# Patient Record
Sex: FEMALE | Race: OTHER | Employment: UNEMPLOYED | URBAN - NONMETROPOLITAN AREA
[De-identification: names, ages, dates, MRNs, and addresses within clinical notes are randomized per-mention and may not be internally consistent; named-entity substitution may affect disease eponyms.]

---

## 2024-08-13 ENCOUNTER — APPOINTMENT (EMERGENCY)
Dept: RADIOLOGY | Facility: HOSPITAL | Age: 74
DRG: 389 | End: 2024-08-13
Payer: COMMERCIAL

## 2024-08-13 ENCOUNTER — HOSPITAL ENCOUNTER (INPATIENT)
Facility: HOSPITAL | Age: 74
LOS: 2 days | Discharge: HOME | DRG: 389 | End: 2024-08-16
Attending: EMERGENCY MEDICINE | Admitting: FAMILY MEDICINE
Payer: COMMERCIAL

## 2024-08-13 ENCOUNTER — APPOINTMENT (EMERGENCY)
Dept: CT IMAGING | Facility: HOSPITAL | Age: 74
DRG: 389 | End: 2024-08-13
Payer: COMMERCIAL

## 2024-08-13 DIAGNOSIS — R10.9 ABDOMINAL PAIN: Primary | ICD-10-CM

## 2024-08-13 DIAGNOSIS — K56.609 SBO (SMALL BOWEL OBSTRUCTION) (HCC): ICD-10-CM

## 2024-08-13 DIAGNOSIS — R91.1 PULMONARY NODULE: ICD-10-CM

## 2024-08-13 DIAGNOSIS — R11.2 NAUSEA AND VOMITING: ICD-10-CM

## 2024-08-13 LAB
ALBUMIN SERPL BCG-MCNC: 5.2 G/DL (ref 3.5–5)
ALP SERPL-CCNC: 59 U/L (ref 34–104)
ALT SERPL W P-5'-P-CCNC: 13 U/L (ref 7–52)
ANION GAP SERPL CALCULATED.3IONS-SCNC: 13 MMOL/L (ref 4–13)
AST SERPL W P-5'-P-CCNC: 17 U/L (ref 13–39)
BASOPHILS # BLD AUTO: 0.02 THOUSANDS/ÂΜL (ref 0–0.1)
BASOPHILS NFR BLD AUTO: 0 % (ref 0–1)
BILIRUB SERPL-MCNC: 0.53 MG/DL (ref 0.2–1)
BNP SERPL-MCNC: 30 PG/ML (ref 0–100)
BUN SERPL-MCNC: 16 MG/DL (ref 5–25)
CALCIUM SERPL-MCNC: 9.8 MG/DL (ref 8.4–10.2)
CARDIAC TROPONIN I PNL SERPL HS: <2 NG/L
CARDIAC TROPONIN I PNL SERPL HS: <2 NG/L
CHLORIDE SERPL-SCNC: 94 MMOL/L (ref 96–108)
CO2 SERPL-SCNC: 28 MMOL/L (ref 21–32)
CREAT SERPL-MCNC: 0.71 MG/DL (ref 0.6–1.3)
EOSINOPHIL # BLD AUTO: 0.03 THOUSAND/ÂΜL (ref 0–0.61)
EOSINOPHIL NFR BLD AUTO: 0 % (ref 0–6)
ERYTHROCYTE [DISTWIDTH] IN BLOOD BY AUTOMATED COUNT: 12.2 % (ref 11.6–15.1)
GFR SERPL CREATININE-BSD FRML MDRD: 84 ML/MIN/1.73SQ M
GLUCOSE SERPL-MCNC: 191 MG/DL (ref 65–140)
HCT VFR BLD AUTO: 46.4 % (ref 34.8–46.1)
HGB BLD-MCNC: 16.1 G/DL (ref 11.5–15.4)
IMM GRANULOCYTES # BLD AUTO: 0.03 THOUSAND/UL (ref 0–0.2)
IMM GRANULOCYTES NFR BLD AUTO: 0 % (ref 0–2)
LIPASE SERPL-CCNC: 48 U/L (ref 11–82)
LYMPHOCYTES # BLD AUTO: 1.42 THOUSANDS/ÂΜL (ref 0.6–4.47)
LYMPHOCYTES NFR BLD AUTO: 17 % (ref 14–44)
MAGNESIUM SERPL-MCNC: 2.3 MG/DL (ref 1.9–2.7)
MCH RBC QN AUTO: 30 PG (ref 26.8–34.3)
MCHC RBC AUTO-ENTMCNC: 34.7 G/DL (ref 31.4–37.4)
MCV RBC AUTO: 86 FL (ref 82–98)
MONOCYTES # BLD AUTO: 0.3 THOUSAND/ÂΜL (ref 0.17–1.22)
MONOCYTES NFR BLD AUTO: 4 % (ref 4–12)
NEUTROPHILS # BLD AUTO: 6.78 THOUSANDS/ÂΜL (ref 1.85–7.62)
NEUTS SEG NFR BLD AUTO: 79 % (ref 43–75)
NRBC BLD AUTO-RTO: 0 /100 WBCS
PLATELET # BLD AUTO: 256 THOUSANDS/UL (ref 149–390)
PMV BLD AUTO: 9.6 FL (ref 8.9–12.7)
POTASSIUM SERPL-SCNC: 4 MMOL/L (ref 3.5–5.3)
PROT SERPL-MCNC: 8 G/DL (ref 6.4–8.4)
RBC # BLD AUTO: 5.37 MILLION/UL (ref 3.81–5.12)
SODIUM SERPL-SCNC: 135 MMOL/L (ref 135–147)
WBC # BLD AUTO: 8.58 THOUSAND/UL (ref 4.31–10.16)

## 2024-08-13 PROCEDURE — 74176 CT ABD & PELVIS W/O CONTRAST: CPT

## 2024-08-13 PROCEDURE — 99285 EMERGENCY DEPT VISIT HI MDM: CPT

## 2024-08-13 PROCEDURE — 0D9670Z DRAINAGE OF STOMACH WITH DRAINAGE DEVICE, VIA NATURAL OR ARTIFICIAL OPENING: ICD-10-PCS | Performed by: HOSPITALIST

## 2024-08-13 PROCEDURE — 96375 TX/PRO/DX INJ NEW DRUG ADDON: CPT

## 2024-08-13 PROCEDURE — 84484 ASSAY OF TROPONIN QUANT: CPT | Performed by: EMERGENCY MEDICINE

## 2024-08-13 PROCEDURE — 93005 ELECTROCARDIOGRAM TRACING: CPT

## 2024-08-13 PROCEDURE — 71045 X-RAY EXAM CHEST 1 VIEW: CPT

## 2024-08-13 PROCEDURE — 36415 COLL VENOUS BLD VENIPUNCTURE: CPT | Performed by: EMERGENCY MEDICINE

## 2024-08-13 PROCEDURE — 83735 ASSAY OF MAGNESIUM: CPT | Performed by: EMERGENCY MEDICINE

## 2024-08-13 PROCEDURE — 85025 COMPLETE CBC W/AUTO DIFF WBC: CPT | Performed by: EMERGENCY MEDICINE

## 2024-08-13 PROCEDURE — 83690 ASSAY OF LIPASE: CPT | Performed by: EMERGENCY MEDICINE

## 2024-08-13 PROCEDURE — 99285 EMERGENCY DEPT VISIT HI MDM: CPT | Performed by: EMERGENCY MEDICINE

## 2024-08-13 PROCEDURE — 96365 THER/PROPH/DIAG IV INF INIT: CPT

## 2024-08-13 PROCEDURE — 80053 COMPREHEN METABOLIC PANEL: CPT | Performed by: EMERGENCY MEDICINE

## 2024-08-13 PROCEDURE — 83880 ASSAY OF NATRIURETIC PEPTIDE: CPT | Performed by: EMERGENCY MEDICINE

## 2024-08-13 RX ORDER — ONDANSETRON 2 MG/ML
4 INJECTION INTRAMUSCULAR; INTRAVENOUS ONCE
Status: COMPLETED | OUTPATIENT
Start: 2024-08-13 | End: 2024-08-13

## 2024-08-13 RX ORDER — BALSALAZIDE DISODIUM 750 MG/1
2250 CAPSULE ORAL 3 TIMES DAILY
COMMUNITY
Start: 2024-07-29

## 2024-08-13 RX ORDER — EMPAGLIFLOZIN, LINAGLIPTIN, METFORMIN HYDROCHLORIDE 10; 5; 1000 MG/1; MG/1; MG/1
1 TABLET, EXTENDED RELEASE ORAL DAILY
COMMUNITY
Start: 2024-08-11

## 2024-08-13 RX ORDER — ACETAMINOPHEN 10 MG/ML
1000 INJECTION, SOLUTION INTRAVENOUS ONCE
Status: COMPLETED | OUTPATIENT
Start: 2024-08-13 | End: 2024-08-14

## 2024-08-13 RX ORDER — SODIUM CHLORIDE, SODIUM GLUCONATE, SODIUM ACETATE, POTASSIUM CHLORIDE, MAGNESIUM CHLORIDE, SODIUM PHOSPHATE, DIBASIC, AND POTASSIUM PHOSPHATE .53; .5; .37; .037; .03; .012; .00082 G/100ML; G/100ML; G/100ML; G/100ML; G/100ML; G/100ML; G/100ML
500 INJECTION, SOLUTION INTRAVENOUS ONCE
Status: COMPLETED | OUTPATIENT
Start: 2024-08-13 | End: 2024-08-13

## 2024-08-13 RX ORDER — FAMOTIDINE 10 MG/ML
40 INJECTION, SOLUTION INTRAVENOUS ONCE
Status: COMPLETED | OUTPATIENT
Start: 2024-08-13 | End: 2024-08-14

## 2024-08-13 RX ORDER — GLIPIZIDE 10 MG/1
10 TABLET, FILM COATED, EXTENDED RELEASE ORAL DAILY
COMMUNITY
Start: 2024-06-03

## 2024-08-13 RX ORDER — MOMETASONE FUROATE MONOHYDRATE 50 UG/1
SPRAY, METERED NASAL
COMMUNITY
Start: 2024-06-03

## 2024-08-13 RX ORDER — METOPROLOL SUCCINATE 50 MG/1
50 TABLET, EXTENDED RELEASE ORAL DAILY
COMMUNITY
Start: 2024-07-14

## 2024-08-13 RX ORDER — LOSARTAN POTASSIUM AND HYDROCHLOROTHIAZIDE 12.5; 1 MG/1; MG/1
1 TABLET ORAL DAILY
COMMUNITY
Start: 2024-08-02

## 2024-08-13 RX ORDER — OMEPRAZOLE 40 MG/1
40 CAPSULE, DELAYED RELEASE ORAL DAILY
COMMUNITY
Start: 2024-08-08

## 2024-08-13 RX ORDER — SEMAGLUTIDE 0.68 MG/ML
0.25 INJECTION, SOLUTION SUBCUTANEOUS
COMMUNITY
Start: 2024-06-21

## 2024-08-13 RX ORDER — ROSUVASTATIN CALCIUM 40 MG/1
40 TABLET, COATED ORAL DAILY
COMMUNITY
Start: 2024-07-06

## 2024-08-13 RX ORDER — ALENDRONATE SODIUM 70 MG/1
70 TABLET ORAL WEEKLY
COMMUNITY
Start: 2024-06-19

## 2024-08-13 RX ORDER — SODIUM CHLORIDE 9 MG/ML
3 INJECTION INTRAVENOUS
Status: DISCONTINUED | OUTPATIENT
Start: 2024-08-13 | End: 2024-08-16 | Stop reason: HOSPADM

## 2024-08-13 RX ADMIN — SODIUM CHLORIDE, SODIUM GLUCONATE, SODIUM ACETATE, POTASSIUM CHLORIDE, MAGNESIUM CHLORIDE, SODIUM PHOSPHATE, DIBASIC, AND POTASSIUM PHOSPHATE 500 ML: .53; .5; .37; .037; .03; .012; .00082 INJECTION, SOLUTION INTRAVENOUS at 22:59

## 2024-08-13 RX ADMIN — ONDANSETRON 4 MG: 2 INJECTION INTRAMUSCULAR; INTRAVENOUS at 22:58

## 2024-08-14 ENCOUNTER — APPOINTMENT (INPATIENT)
Dept: RADIOLOGY | Facility: HOSPITAL | Age: 74
DRG: 389 | End: 2024-08-14
Payer: COMMERCIAL

## 2024-08-14 PROBLEM — I10 ESSENTIAL HYPERTENSION: Status: ACTIVE | Noted: 2022-08-31

## 2024-08-14 PROBLEM — R10.13 EPIGASTRIC PAIN: Status: ACTIVE | Noted: 2024-08-14

## 2024-08-14 PROBLEM — N85.01 SIMPLE ENDOMETRIAL HYPERPLASIA: Status: ACTIVE | Noted: 2022-05-05

## 2024-08-14 PROBLEM — K56.609 SBO (SMALL BOWEL OBSTRUCTION) (HCC): Status: ACTIVE | Noted: 2024-08-14

## 2024-08-14 PROBLEM — K21.9 GASTROESOPHAGEAL REFLUX DISEASE WITHOUT ESOPHAGITIS: Status: ACTIVE | Noted: 2024-08-14

## 2024-08-14 PROBLEM — E11.9 TYPE 2 DIABETES MELLITUS WITHOUT COMPLICATION, WITHOUT LONG-TERM CURRENT USE OF INSULIN (HCC): Status: ACTIVE | Noted: 2024-08-14

## 2024-08-14 PROBLEM — Z86.39 HISTORY OF DIABETES MELLITUS: Status: ACTIVE | Noted: 2022-08-31

## 2024-08-14 PROBLEM — E78.5 HYPERLIPIDEMIA: Status: ACTIVE | Noted: 2022-08-31

## 2024-08-14 PROBLEM — R11.2 NAUSEA AND VOMITING: Status: ACTIVE | Noted: 2024-08-14

## 2024-08-14 LAB
4HR DELTA HS TROPONIN: >1 NG/L
ANION GAP SERPL CALCULATED.3IONS-SCNC: 9 MMOL/L (ref 4–13)
ATRIAL RATE: 85 BPM
ATRIAL RATE: 87 BPM
ATRIAL RATE: 99 BPM
BACTERIA UR QL AUTO: ABNORMAL /HPF
BILIRUB UR QL STRIP: NEGATIVE
BUN SERPL-MCNC: 14 MG/DL (ref 5–25)
CALCIUM SERPL-MCNC: 8.8 MG/DL (ref 8.4–10.2)
CARDIAC TROPONIN I PNL SERPL HS: 3 NG/L
CHLORIDE SERPL-SCNC: 99 MMOL/L (ref 96–108)
CLARITY UR: CLEAR
CO2 SERPL-SCNC: 27 MMOL/L (ref 21–32)
COLOR UR: ABNORMAL
CREAT SERPL-MCNC: 0.73 MG/DL (ref 0.6–1.3)
ERYTHROCYTE [DISTWIDTH] IN BLOOD BY AUTOMATED COUNT: 12 % (ref 11.6–15.1)
GFR SERPL CREATININE-BSD FRML MDRD: 81 ML/MIN/1.73SQ M
GLUCOSE SERPL-MCNC: 110 MG/DL (ref 65–140)
GLUCOSE SERPL-MCNC: 112 MG/DL (ref 65–140)
GLUCOSE SERPL-MCNC: 161 MG/DL (ref 65–140)
GLUCOSE SERPL-MCNC: 93 MG/DL (ref 65–140)
GLUCOSE UR STRIP-MCNC: ABNORMAL MG/DL
HCT VFR BLD AUTO: 42.9 % (ref 34.8–46.1)
HGB BLD-MCNC: 14.7 G/DL (ref 11.5–15.4)
HGB UR QL STRIP.AUTO: ABNORMAL
KETONES UR STRIP-MCNC: ABNORMAL MG/DL
LEUKOCYTE ESTERASE UR QL STRIP: NEGATIVE
MAGNESIUM SERPL-MCNC: 2.2 MG/DL (ref 1.9–2.7)
MCH RBC QN AUTO: 29.7 PG (ref 26.8–34.3)
MCHC RBC AUTO-ENTMCNC: 34.3 G/DL (ref 31.4–37.4)
MCV RBC AUTO: 87 FL (ref 82–98)
NITRITE UR QL STRIP: NEGATIVE
NON-SQ EPI CELLS URNS QL MICRO: ABNORMAL /HPF
P AXIS: 62 DEGREES
P AXIS: 63 DEGREES
P AXIS: 66 DEGREES
PH UR STRIP.AUTO: 7 [PH]
PLATELET # BLD AUTO: 252 THOUSANDS/UL (ref 149–390)
PMV BLD AUTO: 9.4 FL (ref 8.9–12.7)
POTASSIUM SERPL-SCNC: 3.8 MMOL/L (ref 3.5–5.3)
PR INTERVAL: 152 MS
PR INTERVAL: 154 MS
PR INTERVAL: 158 MS
PROT UR STRIP-MCNC: ABNORMAL MG/DL
QRS AXIS: 76 DEGREES
QRS AXIS: 80 DEGREES
QRS AXIS: 84 DEGREES
QRSD INTERVAL: 68 MS
QRSD INTERVAL: 72 MS
QRSD INTERVAL: 78 MS
QT INTERVAL: 364 MS
QT INTERVAL: 386 MS
QT INTERVAL: 394 MS
QTC INTERVAL: 464 MS
QTC INTERVAL: 467 MS
QTC INTERVAL: 468 MS
RBC # BLD AUTO: 4.95 MILLION/UL (ref 3.81–5.12)
RBC #/AREA URNS AUTO: ABNORMAL /HPF
SODIUM SERPL-SCNC: 135 MMOL/L (ref 135–147)
SP GR UR STRIP.AUTO: 1.01 (ref 1–1.03)
T WAVE AXIS: 43 DEGREES
T WAVE AXIS: 53 DEGREES
T WAVE AXIS: 56 DEGREES
UROBILINOGEN UR STRIP-ACNC: <2 MG/DL
VENTRICULAR RATE: 85 BPM
VENTRICULAR RATE: 87 BPM
VENTRICULAR RATE: 99 BPM
WBC # BLD AUTO: 8.91 THOUSAND/UL (ref 4.31–10.16)
WBC #/AREA URNS AUTO: ABNORMAL /HPF

## 2024-08-14 PROCEDURE — 93010 ELECTROCARDIOGRAM REPORT: CPT | Performed by: INTERNAL MEDICINE

## 2024-08-14 PROCEDURE — 84484 ASSAY OF TROPONIN QUANT: CPT | Performed by: EMERGENCY MEDICINE

## 2024-08-14 PROCEDURE — 85027 COMPLETE CBC AUTOMATED: CPT | Performed by: FAMILY MEDICINE

## 2024-08-14 PROCEDURE — NC001 PR NO CHARGE: Performed by: SURGERY

## 2024-08-14 PROCEDURE — 36415 COLL VENOUS BLD VENIPUNCTURE: CPT | Performed by: EMERGENCY MEDICINE

## 2024-08-14 PROCEDURE — 81001 URINALYSIS AUTO W/SCOPE: CPT | Performed by: EMERGENCY MEDICINE

## 2024-08-14 PROCEDURE — 82948 REAGENT STRIP/BLOOD GLUCOSE: CPT

## 2024-08-14 PROCEDURE — 93005 ELECTROCARDIOGRAM TRACING: CPT

## 2024-08-14 PROCEDURE — 83735 ASSAY OF MAGNESIUM: CPT | Performed by: FAMILY MEDICINE

## 2024-08-14 PROCEDURE — 99223 1ST HOSP IP/OBS HIGH 75: CPT

## 2024-08-14 PROCEDURE — 80048 BASIC METABOLIC PNL TOTAL CA: CPT | Performed by: FAMILY MEDICINE

## 2024-08-14 PROCEDURE — 99223 1ST HOSP IP/OBS HIGH 75: CPT | Performed by: FAMILY MEDICINE

## 2024-08-14 PROCEDURE — 71045 X-RAY EXAM CHEST 1 VIEW: CPT

## 2024-08-14 PROCEDURE — 96375 TX/PRO/DX INJ NEW DRUG ADDON: CPT

## 2024-08-14 RX ORDER — MORPHINE SULFATE 4 MG/ML
4 INJECTION, SOLUTION INTRAMUSCULAR; INTRAVENOUS EVERY 4 HOURS PRN
Status: DISCONTINUED | OUTPATIENT
Start: 2024-08-14 | End: 2024-08-16 | Stop reason: HOSPADM

## 2024-08-14 RX ORDER — HYDRALAZINE HYDROCHLORIDE 20 MG/ML
5 INJECTION INTRAMUSCULAR; INTRAVENOUS EVERY 6 HOURS PRN
Status: DISCONTINUED | OUTPATIENT
Start: 2024-08-14 | End: 2024-08-16 | Stop reason: HOSPADM

## 2024-08-14 RX ORDER — LIDOCAINE HYDROCHLORIDE 20 MG/ML
1 JELLY TOPICAL ONCE
Status: COMPLETED | OUTPATIENT
Start: 2024-08-14 | End: 2024-08-14

## 2024-08-14 RX ORDER — HEPARIN SODIUM 5000 [USP'U]/ML
5000 INJECTION, SOLUTION INTRAVENOUS; SUBCUTANEOUS EVERY 8 HOURS SCHEDULED
Status: DISCONTINUED | OUTPATIENT
Start: 2024-08-14 | End: 2024-08-16 | Stop reason: HOSPADM

## 2024-08-14 RX ORDER — FAMOTIDINE 10 MG/ML
20 INJECTION, SOLUTION INTRAVENOUS 2 TIMES DAILY
Status: DISCONTINUED | OUTPATIENT
Start: 2024-08-14 | End: 2024-08-16 | Stop reason: HOSPADM

## 2024-08-14 RX ORDER — ONDANSETRON 2 MG/ML
4 INJECTION INTRAMUSCULAR; INTRAVENOUS EVERY 6 HOURS PRN
Status: DISCONTINUED | OUTPATIENT
Start: 2024-08-14 | End: 2024-08-16 | Stop reason: HOSPADM

## 2024-08-14 RX ORDER — GLUC/MSM/COLGN2/HYAL/ANTIARTH3 375-375-20
1 TABLET ORAL 2 TIMES DAILY
COMMUNITY

## 2024-08-14 RX ORDER — SODIUM CHLORIDE, SODIUM GLUCONATE, SODIUM ACETATE, POTASSIUM CHLORIDE, MAGNESIUM CHLORIDE, SODIUM PHOSPHATE, DIBASIC, AND POTASSIUM PHOSPHATE .53; .5; .37; .037; .03; .012; .00082 G/100ML; G/100ML; G/100ML; G/100ML; G/100ML; G/100ML; G/100ML
100 INJECTION, SOLUTION INTRAVENOUS CONTINUOUS
Status: DISCONTINUED | OUTPATIENT
Start: 2024-08-14 | End: 2024-08-16

## 2024-08-14 RX ORDER — INSULIN LISPRO 100 [IU]/ML
1-6 INJECTION, SOLUTION INTRAVENOUS; SUBCUTANEOUS EVERY 6 HOURS SCHEDULED
Status: DISCONTINUED | OUTPATIENT
Start: 2024-08-14 | End: 2024-08-16

## 2024-08-14 RX ADMIN — FAMOTIDINE 20 MG: 10 INJECTION, SOLUTION INTRAVENOUS at 18:41

## 2024-08-14 RX ADMIN — SODIUM CHLORIDE, SODIUM GLUCONATE, SODIUM ACETATE, POTASSIUM CHLORIDE AND MAGNESIUM CHLORIDE 100 ML/HR: 526; 502; 368; 37; 30 INJECTION, SOLUTION INTRAVENOUS at 02:12

## 2024-08-14 RX ADMIN — INSULIN LISPRO 1 UNITS: 100 INJECTION, SOLUTION INTRAVENOUS; SUBCUTANEOUS at 02:18

## 2024-08-14 RX ADMIN — ACETAMINOPHEN 1000 MG: 1000 INJECTION, SOLUTION INTRAVENOUS at 00:25

## 2024-08-14 RX ADMIN — SODIUM CHLORIDE, SODIUM GLUCONATE, SODIUM ACETATE, POTASSIUM CHLORIDE AND MAGNESIUM CHLORIDE 100 ML/HR: 526; 502; 368; 37; 30 INJECTION, SOLUTION INTRAVENOUS at 13:39

## 2024-08-14 RX ADMIN — HEPARIN SODIUM 5000 UNITS: 5000 INJECTION INTRAVENOUS; SUBCUTANEOUS at 02:13

## 2024-08-14 RX ADMIN — HEPARIN SODIUM 5000 UNITS: 5000 INJECTION INTRAVENOUS; SUBCUTANEOUS at 21:59

## 2024-08-14 RX ADMIN — FAMOTIDINE 40 MG: 10 INJECTION, SOLUTION INTRAVENOUS at 00:24

## 2024-08-14 RX ADMIN — LIDOCAINE HYDROCHLORIDE 1 APPLICATION: 20 JELLY TOPICAL at 01:33

## 2024-08-14 RX ADMIN — HEPARIN SODIUM 5000 UNITS: 5000 INJECTION INTRAVENOUS; SUBCUTANEOUS at 13:38

## 2024-08-14 RX ADMIN — INSULIN LISPRO 1 UNITS: 100 INJECTION, SOLUTION INTRAVENOUS; SUBCUTANEOUS at 08:04

## 2024-08-14 RX ADMIN — FAMOTIDINE 20 MG: 10 INJECTION, SOLUTION INTRAVENOUS at 08:06

## 2024-08-14 NOTE — PLAN OF CARE
Problem: PAIN - ADULT  Goal: Verbalizes/displays adequate comfort level or baseline comfort level  Description: Interventions:  - Encourage patient to monitor pain and request assistance  - Assess pain using appropriate pain scale  - Administer analgesics based on type and severity of pain and evaluate response  - Implement non-pharmacological measures as appropriate and evaluate response  - Consider cultural and social influences on pain and pain management  - Notify physician/advanced practitioner if interventions unsuccessful or patient reports new pain  Outcome: Progressing     Problem: INFECTION - ADULT  Goal: Absence or prevention of progression during hospitalization  Description: INTERVENTIONS:  - Assess and monitor for signs and symptoms of infection  - Monitor lab/diagnostic results  - Monitor all insertion sites, i.e. indwelling lines, tubes, and drains  - Monitor endotracheal if appropriate and nasal secretions for changes in amount and color  - [place] appropriate cooling/warming therapies per order  - Administer medications as ordered  - Instruct and encourage patient and family to use good hand hygiene technique  - Identify and instruct in appropriate isolation precautions for identified infection/condition  Outcome: Progressing     Problem: SAFETY ADULT  Goal: Patient will remain free of falls  Description: INTERVENTIONS:  - Educate patient/family on patient safety including physical limitations  - Instruct patient to call for assistance with activity   - Consult OT/PT to assist with strengthening/mobility   - Keep Call bell within reach  - Keep bed low and locked with side rails adjusted as appropriate  - Keep care items and personal belongings within reach  - Initiate and maintain comfort rounds  - Make Fall Risk Sign visible to staff  - Offer Toileting every 2 Hours, in advance of need  - Initiate/Maintain bed/chair alarm  - Obtain necessary fall risk management equipment: nonskid socks  -  Apply yellow socks and bracelet for high fall risk patients  - Consider moving patient to room near nurses station  Outcome: Progressing  Goal: Maintain or return to baseline ADL function  Description: INTERVENTIONS:  -  Assess patient's ability to carry out ADLs; assess patient's baseline for ADL function and identify physical deficits which impact ability to perform ADLs (bathing, care of mouth/teeth, toileting, grooming, dressing, etc.)  - Assess/evaluate cause of self-care deficits   - Assess range of motion  - Assess patient's mobility; develop plan if impaired  - Assess patient's need for assistive devices and provide as appropriate  - Encourage maximum independence but intervene and supervise when necessary  - Involve family in performance of ADLs  - Assess for home care needs following discharge   - Consider OT consult to assist with ADL evaluation and planning for discharge  - Provide patient education as appropriate  Outcome: Progressing  Goal: Maintains/Returns to pre admission functional level  Description: INTERVENTIONS:  - Perform AM-PAC 6 Click Basic Mobility/ Daily Activity assessment daily.  - Set and communicate daily mobility goal to care team and patient/family/caregiver.   - Collaborate with rehabilitation services on mobility goals if consulted  - Perform Range of Motion 3 times a day.  - Reposition patient every 3 hours.  - Dangle patient 3 times a day  - Stand patient 3 times a day  - Ambulate patient 3 times a day  - Out of bed to chair 3 times a day   - Out of bed for meals 3 times a day  - Out of bed for toileting  - Record patient progress and toleration of activity level   Outcome: Progressing     Problem: DISCHARGE PLANNING  Goal: Discharge to home or other facility with appropriate resources  Description: INTERVENTIONS:  - Identify barriers to discharge w/patient and caregiver  - Arrange for needed discharge resources and transportation as appropriate  - Identify discharge learning  needs (meds, wound care, etc.)  - Arrange for interpretive services to assist at discharge as needed  - Refer to Case Management Department for coordinating discharge planning if the patient needs post-hospital services based on physician/advanced practitioner order or complex needs related to functional status, cognitive ability, or social support system  Outcome: Progressing     Problem: Knowledge Deficit  Goal: Patient/family/caregiver demonstrates understanding of disease process, treatment plan, medications, and discharge instructions  Description: Complete learning assessment and assess knowledge base.  Interventions:  - Provide teaching at level of understanding  - Provide teaching via preferred learning methods  Outcome: Progressing     Problem: GASTROINTESTINAL - ADULT  Goal: Minimal or absence of nausea and/or vomiting  Description: INTERVENTIONS:  - Administer IV fluids if ordered to ensure adequate hydration  - Maintain NPO status until nausea and vomiting are resolved  - Nasogastric tube if ordered  - Administer ordered antiemetic medications as needed  - Provide nonpharmacologic comfort measures as appropriate  - Advance diet as tolerated, if ordered  - Consider nutrition services referral to assist patient with adequate nutrition and appropriate food choices  Outcome: Progressing  Goal: Maintains or returns to baseline bowel function  Description: INTERVENTIONS:  - Assess bowel function  - Encourage oral fluids to ensure adequate hydration  - Administer IV fluids if ordered to ensure adequate hydration  - Administer ordered medications as needed  - Encourage mobilization and activity  - Consider nutritional services referral to assist patient with adequate nutrition and appropriate food choices  Outcome: Progressing  Goal: Maintains adequate nutritional intake  Description: INTERVENTIONS:  - Monitor percentage of each meal consumed  - Identify factors contributing to decreased intake, treat as  appropriate  - Assist with meals as needed  - Monitor I&O, weight, and lab values if indicated  - Obtain nutrition services referral as needed  Outcome: Progressing  Goal: Oral mucous membranes remain intact  Description: INTERVENTIONS  - Assess oral mucosa and hygiene practices  - Implement preventative oral hygiene regimen  - Implement oral medicated treatments as ordered  - Initiate Nutrition services referral as needed  Outcome: Progressing

## 2024-08-14 NOTE — H&P
[facility]  H&P  Name: Xena Holly 74 y.o. female I MRN: [id number]  Unit/Bed#: RM12 I Date of Admission: 8/13/2024   Date of Service: 8/14/2024 I Hospital Day: 0      Assessment & Plan   * SBO (small bowel obstruction) ([facility])  Assessment & Plan  NPO  NGT  Consult to general surgery  Pain medicine    CT Abd: Findings consistent with small bowel obstruction with transition point in the right mid abdomen involving a mild to distal jejunal loop with visualization of the distended small bowel loops prior to this region    Gastroesophageal reflux disease without esophagitis  Assessment & Plan  Continue pepcid 20mg iv bid    Type 2 diabetes mellitus without complication, without long-term current use of insulin ([facility])  Assessment & Plan  Will place on ISS q6hrs      Essential hypertension  Assessment & Plan  Will place on Hydralazine prn.          Disposition  #1 NG Tube  #2 consult to general surgery  #3 npo  #4 ISS  #5 labs in am  #6 will need to do med rec... family will bring med list in the morning        VTE Prophylaxis: Heparin  / sequential compression device   Code Status: Level 1 - Full Code       Anticipated Length of Stay:  Patient will be admitted on an Inpatient basis with an anticipated length of stay of greater than 2 midnights.   Justification for Hospital Stay: Please see detailed plans noted above.    Chief Complaint:     Abdominal pain  History of Present Illness:  Xena Holly is a 74 y.o. female who has past medical history significant for hypertension, hyperlipidemia, diabetes mellitus comes in for abdominal pain, nausea and vomiting.  She reports some chills but no fevers.  She was eating and drinking normally and then had a bout of vomitus episode.  Reports headache.  Notices her blood pressure elevated at home.  She is [place]ian-speaking only and her son is here at bedside who is the historian.  At this time they do not know the medication that she takes at home and will bring  them in tomorrow.  CT scan confirms small bowel obstruction.      Review of Systems:    Constitutional:  chills   Eyes:  Denies change in visual acuity   HENT:  Denies nasal congestion or sore throat   Respiratory:  Denies cough or shortness of breath   Cardiovascular:  Denies chest pain or edema   GI: Abdominal pain, nausea, vomiting  :  Denies dysuria   Musculoskeletal:  Denies back pain or joint pain   Integument:  Denies rash   Neurologic:  Denies headache or sensory changes   Endocrine:  Denies polyuria or polydipsia   Lymphatic:  Denies swollen glands   Psychiatric:  Denies depression or anxiety     Past Medical and Surgical History:   Past Medical History:   Diagnosis Date    Diabetes mellitus (HCC)     HLD (hyperlipidemia)     Hypertension      Past Surgical History:   Procedure Laterality Date    CHOLECYSTECTOMY         Meds/Allergies:  Medications will be brought in the morning by family members    Allergies: No Known Allergies    History:  Marital Status:      Substance Use History:   Social History     Substance and Sexual Activity   Alcohol Use Not Currently     Social History     Tobacco Use   Smoking Status Former    Types: Cigarettes   Smokeless Tobacco Former     Social History     Substance and Sexual Activity   Drug Use Never       Family History:  History reviewed. No pertinent family history.    Physical Exam:     Vitals:   Blood Pressure: (!) 182/83 (08/13/24 2203)  Pulse: 91 (08/13/24 2203)  Temperature: 98.9 °F (37.2 °C) (08/13/24 2021)  Temp Source: Temporal (08/13/24 2021)  Respirations: 20 (08/13/24 2203)  Weight - Scale: 74.8 kg (165 lb) (08/13/24 2021)  SpO2: 97 % (08/13/24 2203)    Constitutional:  Non-toxic appearance  Eyes:  EOMI, No scleral icterus   HENT:   oropharynx moist, external ears normal, external nose normal   Respiratory:  No respiratory distress, no wheezing   Cardiovascular:  Normal rate, no murmurs   GI:  Soft, some distention, no bowel sounds  :  No  costovertebral angle tenderness   Musculoskeletal:  no tenderness, no deformities.   Integument:  no jaundice, no rash   Neurologic:  Alert &awake, communicative, CN 2-12 normal,  no focal deficits noted   Psychiatric:  Speech and behavior appropriate       Lab Results: I have personally reviewed pertinent reports.      Results from last 7 days   Lab Units 08/13/24  2101   WBC Thousand/uL 8.58   HEMOGLOBIN g/dL 16.1*   HEMATOCRIT % 46.4*   PLATELETS Thousands/uL 256   SEGS PCT % 79*   LYMPHO PCT % 17   MONO PCT % 4   EOS PCT % 0     Results from last 7 days   Lab Units 08/13/24  2101   POTASSIUM mmol/L 4.0   CHLORIDE mmol/L 94*   CO2 mmol/L 28   BUN mg/dL 16   CREATININE mg/dL 0.71   CALCIUM mg/dL 9.8   ALK PHOS U/L 59   ALT U/L 13   AST U/L 17               Imaging: I have personally reviewed pertinent reports.      CT abdomen pelvis wo contrast    Result Date: 8/14/2024  Narrative: CT ABDOMEN AND PELVIS WITHOUT IV CONTRAST INDICATION: epigastric pain. COMPARISON: None. TECHNIQUE: CT examination of the abdomen and pelvis was performed without intravenous contrast. Multiplanar 2D reformatted images were created from the source data. This examination, like all CT scans performed in the [facility], was performed utilizing techniques to minimize radiation dose exposure, including the use of iterative reconstruction and automated exposure control. Radiation dose length product (DLP) for this visit: 640 mGy-cm Enteric Contrast: Not administered. FINDINGS: ABDOMEN LOWER CHEST: No pleural or pericardial effusions. Parenchymal scarring in the lateral right lung base. Subpleural 4 mm left lower lobe nodular density noted, no routine follow-up imaging recommended per current Fleischner Society guidelines for low risk patient. Optional 12-month follow-up CT chest for high risk patient. LIVER/BILIARY TREE: Unremarkable. GALLBLADDER: Post cholecystectomy. SPLEEN: Unremarkable. PANCREAS: Unremarkable. ADRENAL  GLANDS: Unremarkable. KIDNEYS/URETERS: No hydronephrosis or urinary tract calculi. Subcentimeter hypoattenuating renal lesion(s), too small to characterize but statistically likely benign, which do not warrant follow-up (Radiology June 2019). STOMACH AND BOWEL: Stomach is markedly distended with air and fluid/debris. No intraluminal mass or irregular wall thickening. Abnormal distention of the proximal small bowel loops containing air, fluid, and debris measuring up to 3.5 cm in diameter noted. Transition point in the right mid abdomen involving a mid to distal jejunal loop is seen with fecalization of the distended small bowel loop just prior to this region. Findings are consistent with small bowel obstruction. Terminal ileum and appendix are grossly unremarkable. APPENDIX: No findings to suggest appendicitis. ABDOMINOPELVIC CAVITY: No ascites or loculated fluid collection. No pneumoperitoneum. No lymphadenopathy. VESSELS: Extensive calcific atherosclerosis without abdominal aortic aneurysm. PELVIS REPRODUCTIVE ORGANS: Unremarkable for patient's age. A few punctate pelvic phleboliths. URINARY BLADDER: Unremarkable. ABDOMINAL WALL/INGUINAL REGIONS: Unremarkable. BONES: No acute fracture or suspicious osseous lesion.     Impression: Findings consistent with small bowel obstruction with transition point in the right mid abdomen involving a mid to distal jejunal loop with fecalization of the distended small bowel loop prior to this region. No free air, pneumatosis intestinalis, free fluid, or loculated fluid collections in the abdomen/pelvis. Additional ancillary findings detailed above. Workstation performed: [id number]         Medical decision making: High  Diagnosis addressed: 1 illness that poses a threat to bodily injury with small bowel obstruction, may need surgery  Data:   Reviewed  CBC, CMP, troponin, catheter  Ordered CBC, BMP, mag, A1c  Reviewed external notes from OB/GYN  Assessment by independent historian:  Her son was at bedside as an historian as he translates, as patient/speaking only  Independent interpretation of imaging:  Independent interpretation of testing EKG/telemetry: Interpreted EKG myself which shows normal sinus rhythm  Discussion of management with ER provider and general surgery: NG tube, n.p.o., IV fluids           Risk:  Prescription drug management: IV Pepcid, IV fluids  Discussion for hospitalization with ER provider: Hospitalization for small bowel obstruction  Initiation of parenteral controlled substances: IV morphine                   Epic Records Reviewed as well as Records in Care Everywhere    ** Please Note: Dragon 360 Dictation voice to text software was used in the creation of this document. **         No

## 2024-08-14 NOTE — QUICK NOTE
Post Admit Follow Up Note-  Patient seen and evaluated and with daughter at bedside  Continue NG tube to suction  Medication list confirmed and updated  Continue IV fluids for hydration  Labs reviewed, no intervention at this time.  Surgical consult appreciated, continue medical management  Chest x-ray reviewed, confirms NGT in appropriate placement    Patient denies pain.  She feels hungry and thirsty and complains of dry mouth.  Nursing to provide swabs to moisten mouth.  All questions and concerns addressed.

## 2024-08-14 NOTE — UTILIZATION REVIEW
Initial Clinical Review    8/13 - treatment started in the ED meeting for 1MN stay.     Admission: Date/Time/Statement:   Admission Orders (From admission, onward)       Ordered        08/14/24 0101  INPATIENT ADMISSION  Once                          Orders Placed This Encounter   Procedures    INPATIENT ADMISSION     Standing Status:   Standing     Number of Occurrences:   1     Order Specific Question:   Level of Care     Answer:   Med Surg [16]     Order Specific Question:   Estimated length of stay     Answer:   More than 2 Midnights     Order Specific Question:   Certification     Answer:   I certify that inpatient services are medically necessary for this patient for a duration of greater than two midnights. See H&P and MD Progress Notes for additional information about the patient's course of treatment.     ED Arrival Information       Expected   -    Arrival   8/13/2024 19:30    Acuity   Emergent              Means of arrival   Walk-In    Escorted by   Family Member    Service   Hospitalist    Admission type   Emergency              Arrival complaint   vomiting             Chief Complaint   Patient presents with    Abdominal Pain    Vomiting    Hypertension     Patient presents to ED from home accompanied by son w/c/o epigastric pain w/vomiting x 2hours, also reports systolic BP at home above 200. Patient denies CP/SOB, reports chills denies fever, reports normal bm @ 2pm today and was eating/drinking normally prior to vomiting, reports headache       Initial Presentation: 74 y.o. female who presented self from home to [facility] ED. Admitted as Inpatient for evaluation and treatment of SBO     PMHx:  has a past medical history of Diabetes mellitus (HCC), HLD (hyperlipidemia), and Hypertension.    PT   Presented w/ abd pain and N/V with some chills but no fever x 2hrs. Pt reports normal BM at 2pm today and was eating/drinking normally prior to vomiting. PT also reports SBP above 200 at home. In the  ED, BP noted to be 213/95. Pt given Pepcid IV x2, IV fluids and Zofran IV in the ED. On exam, Abd soft but distended with epigastric tenderness. Imaging CT abd/pelvis:Findings consistent with small bowel obstruction with transition point in the right mid abdomen involving a mid to distal jejunal loop with fecalization of the distended small bowel loop prior to this region. No free air, pneumatosis intestinalis, free fluid, or loculated fluid collections in the abdomen/pelvis.     Plan: NPO, NGT, Pain control. Pepcid IV, Hydralazine PRN. General surgery consulted.      Anticipated Length of Stay/Certification Statement:  Patient will be admitted on an Inpatient basis with an anticipated length of stay of greater than 2 midnights.     Date: 8/14/24    Surgery consult: SBO. Right now there is a very small amount of greenish bilious emesis in the NG tubing with the canister empty. No indication for any urgent or emergent general surgical intervention at this time. Plan: NGT to continuous low wall suction. Keep NPO. IV fluids, Record NG tube drainage. Pain control and antiemetics. Repeat CBC, BMP and mag in am. Obtain stat CXR to check position of NG tube due to minimal NG tube output for past 8hrs. Serial abd exams. Gastrografin challenge study after pt has had NG tube decompression for 24hrs.     Date: 8/15/24  Day 3: Has surpassed a 2nd midnight with active treatments and services.   Pt feels less distended today and reports passing small amounts of flatus but no BM.  Continues w/ NGT - 250 ml bilious drainage noted in canister. Most likely cause of her first episode SBO would be probable abdominal adhesions from prior abdominal surgeries including laparoscopic cholecystectomy. Plan: Continue w/ NGT. Proceed w/ Gastrographin challenge today, if no obstruction then discontinue NG tube later this afternoon. Maintain IV fluids. Analgesics and antiemetics as needed.   Certification Statement: The patient will continue to  require additional inpatient hospital stay due to mild bowel obstruction with need for further acute monitoring and intervention     ED Triage Vitals [08/13/24 2021]   Temperature Pulse Respirations Blood Pressure SpO2 Pain Score   98.9 °F (37.2 °C) 82 16 (!) 213/95 97 % 5     Weight (last 2 days)       Date/Time Weight    08/14/24 2114 73.2 (161.38)    08/14/24 1637 73.2 (161.38)    08/13/24 2021 74.8 (165)            Vital Signs (last 3 days)       Date/Time Temp Pulse Resp BP MAP (mmHg) SpO2 O2 Device Patient Position - Orthostatic VS Pain    08/15/24 0957 -- -- -- -- -- -- -- -- No Pain    08/15/24 06:19:41 98 °F (36.7 °C) 84 20 168/67 101 95 % -- -- --    08/14/24 22:14:22 98.4 °F (36.9 °C) 86 20 176/90 119 98 % -- -- --    08/14/24 2114 98.2 °F (36.8 °C) 85 17 165/76 -- -- -- -- --    08/14/24 1957 -- -- -- -- -- -- None (Room air) -- No Pain    08/14/24 1842 -- -- -- -- -- -- -- -- No Pain    08/14/24 1637 -- -- 17 -- -- -- None (Room air) Lying --    08/14/24 16:35:47 98.2 °F (36.8 °C) 85 17 165/76 106 97 % -- -- --    08/14/24 1624 -- -- -- -- -- -- None (Room air) -- No Pain    08/14/24 1401 -- -- -- -- -- -- -- -- No Pain    08/14/24 0809 97.4 °F (36.3 °C) 87 16 150/65 94 96 % None (Room air) Lying --    08/14/24 0400 -- 93 21 168/69 99 94 % None (Room air) Lying --    08/14/24 0111 -- -- -- -- -- -- None (Room air) -- --    08/13/24 2232 -- -- -- -- -- -- -- -- 9    08/13/24 2203 -- 91 20 182/83 119 97 % -- -- --    08/13/24 2025 -- -- -- -- -- 97 % -- -- --    08/13/24 2021 98.9 °F (37.2 °C) 82 16 213/95 -- 97 % None (Room air) -- 5              Pertinent Labs/Diagnostic Test Results:   Radiology:  CT abdomen pelvis wo contrast   Final Interpretation by Jose Ramon Augustine MD (08/14 0040)      Findings consistent with small bowel obstruction with transition point in the right mid abdomen involving a mid to distal jejunal loop with fecalization of the distended small bowel loop prior to this region. No free  air, pneumatosis intestinalis, free    fluid, or loculated fluid collections in the abdomen/pelvis. Additional ancillary findings detailed above.      Workstation performed: [id number]         X-ray chest 1 view portable   ED Interpretation by Kassie Green MD (08/13 2251)   Per my independent interpretation. Radiologist to provide formal read. Elevation of rt hemidiaphragm no infiltrate no effusion      Final Interpretation by Mikael Rodriguez MD (08/14 2793)   Mild scarring right lung base with lungs otherwise clear. No consolidation.      Workstation performed: [id number]           Cardiology:  ECG 12 lead   Final Result by Leonie Eng DO (08/14 0926)   Normal sinus rhythm   Normal ECG   When compared with ECG of 13-AUG-2024 22:53,   No significant change was found   Confirmed by Leonie Eng ([id number]) on 8/14/2024 9:26:46 AM      ECG 12 lead   Final Result by Leonie Eng DO (08/14 0926)   Normal sinus rhythm   Normal ECG   When compared with ECG of 13-AUG-2024 20:37,   No significant change was found   Confirmed by Leonie Eng ([id number]) on 8/14/2024 9:26:43 AM      ECG 12 lead   Final Result by Leonie Eng DO (08/14 0926)   Normal sinus rhythm   Normal ECG   No previous ECGs available   Confirmed by Leonie Eng ([id number]) on 8/14/2024 9:26:41 AM            Results from last 7 days   Lab Units 08/15/24  0511 08/14/24  0424 08/13/24  2101   WBC Thousand/uL 5.87 8.91 8.58   HEMOGLOBIN g/dL 14.0 14.7 16.1*   HEMATOCRIT % 41.7 42.9 46.4*   PLATELETS Thousands/uL 194 252 256   TOTAL NEUT ABS Thousands/µL 2.88  --  6.78         Results from last 7 days   Lab Units 08/15/24  0511 08/14/24  0424 08/13/24  2101   SODIUM mmol/L 136 135 135   POTASSIUM mmol/L 3.8 3.8 4.0   CHLORIDE mmol/L 101 99 94*   CO2 mmol/L 24 27 28   ANION GAP mmol/L 11 9 13   BUN mg/dL 14 14 16   CREATININE mg/dL 0.65 0.73 0.71   EGFR ml/min/1.73sq m 87 81 84   CALCIUM mg/dL 7.9* 8.8 9.8   MAGNESIUM mg/dL 2.2 2.2 2.3   PHOSPHORUS mg/dL 2.6  --   --       Results from last 7 days   Lab Units 08/13/24  2101   AST U/L 17   ALT U/L 13   ALK PHOS U/L 59   TOTAL PROTEIN g/dL 8.0   ALBUMIN g/dL 5.2*   TOTAL BILIRUBIN mg/dL 0.53     Results from last 7 days   Lab Units 08/15/24  0529 08/15/24  0013 08/14/24  1822 08/14/24  1147 08/14/24  0745   POC GLUCOSE mg/dl 95 88 110 93 161*     Results from last 7 days   Lab Units 08/15/24  0511 08/14/24  0424 08/13/24  2101   GLUCOSE RANDOM mg/dL 78 112 191*         Results from last 7 days   Lab Units 08/14/24  0134 08/13/24  2254 08/13/24  2101   HS TNI 0HR ng/L  --   --  <2   HS TNI 2HR ng/L  --  <2  --    HS TNI 4HR ng/L 3  --   --    HSTNI D4 ng/L >1  --   --            Results from last 7 days   Lab Units 08/13/24  2101   BNP pg/mL 30         Results from last 7 days   Lab Units 08/13/24  2101   LIPASE u/L 48     Results from last 7 days   Lab Units 08/14/24  0214   CLARITY UA  Clear   COLOR UA  Light Yellow   SPEC GRAV UA  1.015   PH UA  7.0   GLUCOSE UA mg/dl 1000 (1%)*   KETONES UA mg/dl 10 (1+)*   BLOOD UA  Small*   PROTEIN UA mg/dl Trace*   NITRITE UA  Negative   BILIRUBIN UA  Negative   UROBILINOGEN UA (BE) mg/dl <2.0   LEUKOCYTES UA  Negative   WBC UA /hpf 0-1   RBC UA /hpf 4-10*   BACTERIA UA /hpf Occasional   EPITHELIAL CELLS WET PREP /hpf Occasional           ED Treatment-Medication Administration from 08/13/2024 1929 to 08/14/2024 0923         Date/Time Order Dose Route Action     08/13/2024 2258 ondansetron (ZOFRAN) injection 4 mg 4 mg Intravenous Given     08/13/2024 2259 multi-electrolyte (ISOLYTE-S PH 7.4) bolus 500 mL 500 mL Intravenous New Bag     08/14/2024 0024 Famotidine (PF) (PEPCID) injection 40 mg 40 mg Intravenous Given     08/14/2024 0025 acetaminophen (Ofirmev) injection 1,000 mg 1,000 mg Intravenous New Bag     08/14/2024 0133 lidocaine (URO-JET) 2 % urethral/mucosal gel 1 Application 1 Application Topical Given     08/14/2024 0212 multi-electrolyte (PLASMALYTE-A/ISOLYTE-S PH 7.4) IV solution 100  mL/hr Intravenous New Bag     08/14/2024 0213 heparin (porcine) subcutaneous injection 5,000 Units 5,000 Units Subcutaneous Given     08/14/2024 0218 insulin lispro (HumALOG/ADMELOG) 100 units/mL subcutaneous injection 1-6 Units 1 Units Subcutaneous Given     08/14/2024 0804 insulin lispro (HumALOG/ADMELOG) 100 units/mL subcutaneous injection 1-6 Units 1 Units Subcutaneous Given     08/14/2024 0806 Famotidine (PF) (PEPCID) injection 20 mg 20 mg Intravenous Given            Past Medical History:   Diagnosis Date    Diabetes mellitus ([facility])     HLD (hyperlipidemia)     Hypertension      Present on Admission:   SBO (small bowel obstruction) ([facility])   Essential hypertension   Type 2 diabetes mellitus without complication, without long-term current use of insulin ([facility])   Gastroesophageal reflux disease without esophagitis      Admitting Diagnosis: SBO (small bowel obstruction) ([facility]) [K56.609]  Abdominal pain [R10.9]  Nausea and vomiting [R11.2]  Pulmonary nodule [R91.1]  Age/Sex: 74 y.o. female  Admission Orders:  Scheduled Medications:  Famotidine (PF), 20 mg, Intravenous, BID  heparin (porcine), 5,000 Units, Subcutaneous, Q8H DICK  insulin lispro, 1-6 Units, Subcutaneous, Q6H DICK      Continuous IV Infusions:  multi-electrolyte, 100 mL/hr, Intravenous, Continuous    PRN Meds:  hydrALAZINE, 5 mg, Intravenous, Q6H PRN  morphine injection, 2 mg, Intravenous, Q4H PRN  morphine injection, 4 mg, Intravenous, Q4H PRN  ondansetron, 4 mg, Intravenous, Q6H PRN  sodium chloride (PF), 3 mL, Intravenous, Q1H PRN      IP CONSULT TO ACUTE CARE SURGERY  IP CONSULT TO CASE MANAGEMENT    Network Utilization Review Department  ATTENTION: Please call with any questions or concerns to [phone] and carefully listen to the prompts so that you are directed to the right person. All voicemails are confidential.   For Discharge needs, contact Care Management DC Support Team at [phone] opt. 2  Send all requests for admission clinical  reviews, approved or denied determinations and any other requests to dedicated fax number below belonging to the campus where the patient is receiving treatment. List of dedicated fax numbers for the Facilities:  FACILITY NAME UR FAX NUMBER   ADMISSION DENIALS (Administrative/Medical Necessity) [phone]   DISCHARGE SUPPORT TEAM (NETWORK) [phone]   PARENT CHILD HEALTH (Maternity/NICU/Pediatrics) [phone]   [facility] [phone]   [facility] [phone]   [facility] [phone]   [facility] [phone]   Critical access hospital [phone]   [facility] [phone]   [facility] [phone]   [facility] [phone]   [facility] [phone]   [facility] [phone]   [facility] [phone]   [facility] [phone]

## 2024-08-14 NOTE — UTILIZATION REVIEW
NOTIFICATION OF INPATIENT ADMISSION   AUTHORIZATION REQUEST   SERVICING FACILITY:   [place] [address]  Tax ID:  [id number]  NPI: [id number] ATTENDING PROVIDER:  Attending Name and NPI#: Iglesia Hernandez Md [[id number]]  Address: [address] [place] [address]  Phone: [phone]     ADMISSION INFORMATION:  Place of Service: Inpatient [facility]  Place of Service Code: 21  Inpatient Admission Date/Time: 8/14/24  1:01 AM  Discharge Date/Time: No discharge date for patient encounter.  Admitting Diagnosis Code/Description:  Abdominal pain [R10.9]     UTILIZATION REVIEW CONTACT:  Tony Foster Utilization   Network Utilization Review Department  Phone: [phone]  Fax [phone]  Email: [email or web address]  Contact for approvals/pending authorizations, clinical reviews, and discharge.     PHYSICIAN ADVISORY SERVICES:  Medical Necessity Denial & [employer] Review  Phone: [phone]  Fax: [phone]  Email: [email or web address]     DISCHARGE SUPPORT TEAM:  For Patients Discharge Needs & Updates  Phone: [phone] opt. 2 Fax: [phone]  Email: [email or web address]

## 2024-08-14 NOTE — ASSESSMENT & PLAN NOTE
Presented with abdominal pain nausea with imaging findings as follow   CT Abd: Findings consistent with small bowel obstruction with transition point in the right mid abdomen involving a mild to distal jejunal loop with visualization of the distended small bowel loops prior to this regionGeneral surgery consulted and following   Appreciate surgical input  NGT removed  Gastrografin shows passed contrast  Advanced to surgical lite today, If tolerates can likely be discharged later

## 2024-08-14 NOTE — ASSESSMENT & PLAN NOTE
NPO  NGT  Consult to general surgery  Pain medicine    CT Abd: Findings consistent with small bowel obstruction with transition point in the right mid abdomen involving a mild to distal jejunal loop with visualization of the distended small bowel loops prior to this region

## 2024-08-14 NOTE — CONSULTS
Consultation - General Surgery   Xena Holly 74 y.o. female MRN: [id number]  Unit/Bed#: RM22 Encounter: [id number]    Assessment & Plan     Assessment:    74-year-old Turkmen-speaking female presented to the emergency department and seen overnight with epigastric pain with nausea and vomiting which began between 4 - 5pm yesterday afternoon.  No previous history of similar symptoms.  She had a normal bowel movement yesterday without melena.  Patient was noted to have blood pressure with systolic greater than 200.  She also had a mild headache at present.  No shortness of breath or chest pain.  Past surgical history included laparoscopic cholecystectomy and tubal ligation.  Patient was seen through the ED where CT scan abdomen pelvis with below mentioned findings showed small bowel obstruction with transition zone.  NG tube was placed in the ED.  Her son provides interpretation for her to obtain medical history and to elicit findings upon physical examination.    Medical history includes type 2 diabetes mellitus, essential hypertension, hyperlipidemia, endometrial hyperplasia, ulcerative colitis currently on balsalazide and previous meningioma.    CT findings consistent with small bowel obstruction with transition point in the right mid abdomen involving a mid to distal jejunal loop with fecalization of the distended small bowel loop prior to this region. No free air, pneumatosis intestinalis, free   fluid, or loculated fluid collections in the abdomen/pelvis.    4 episodes of vomiting since onset of symptoms yesterday.  Normal bowel movement yesterday without blood.  She had an EGD performed in 1997.  Son does not know that the patient has had any history of ulcer disease.  She is a nondrinker.    NG tube was placed in the ED around 2 AM.  Right now there is a very small amount of greenish bilious emesis in the NG tubing with the canister empty.    CBC shows a normal white count 8.91  Hemoglobin stable 14.7  BMP  normal electrolytes and kidney function  Magnesium 2.2  UA trace protein, glucosuria and 1+ ketones, small amount of occult blood.  BNP normal 30  Lipase normal 48  HS troponins at 0, 2 and 4 hours all normal.    Vital signs reviewed, hemodynamically stable right now.  Noted accelerated hypertension upon initial presentation to the ED last evening with /95, most recently 150/65 with a MAP of 94.  SpO2 on room air 96%.    Plan:  No indication for any urgent or emergent general surgical intervention at this time.  NG tube to continuous low wall suction  Chloraseptic throat spray every 2 hours for dry mouth ordered  Keep n.p.o.  Maintain IV fluids for hydration, Plasma-Lyte at 100 mL/h.  Record intake and output by nursing daily including NG tube drainage.  Analgesics and antiemetics as ordered as needed, IV Pepcid twice daily ordered by hospitalist  Repeat a.m. labs including CBC, BMP and magnesium.  Will obtain portable chest x-ray now to check position of NG tube because very minimal NG tube output now for 8 hours.  Serial abdominal exams  Discussed with Dr. Avila, he will examine the patient later today.  Will reassess in the a.m., if the patient still continues with epigastric pain and nausea then we will proceed with Gastrografin challenge study after the patient has had NG tube decompression for 24 hours.  Remainder of medical management per the attending primary hospitalist service    History of Present Illness     HPI:  Xena Holly is a 74 y.o. Tuvaluan-speaking female who presents with onset of epigastric pain with nausea and 4 episodes of vomiting which began between 4 to 5 PM last evening.  No previous history of similar symptoms.  She had previous laparoscopic cholecystectomy.  No blood in the emesis.  Denies any chest pain or shortness of breath.  No prior history of similar symptoms or bowel obstruction.  Passing urine without problem.  Denies dysuria.  Medical history includes type 2 diabetes  mellitus, essential hypertension, hyperlipidemia, endometrial hyperplasia, ulcerative colitis currently on balsalazide and previous meningioma.  Patient had noticed that her blood pressure was very elevated yesterday, mild headache upon presentation to the ED.  No prior history of GI bleed or ulcer disease.  Son provides translation for his mother.  Apparently she had an EGD done 1997 but the son was not sure of the results.  No history of inflammatory bowel disease.  CT abdomen pelvis done showed small bowel obstruction with transition point in the right mid abdomen with mid to distal jejunal loop with fecalization of the distended small bowel loop prior to this region. No free air, pneumatosis intestinalis, free fluid, or loculated fluid collections.  ED contacted the general surgeon on-call overnight, recommendation for NG tube which was inserted around 2 AM.  Overnight minimal NG tube bilious drainage in the tubing and the canister is essentially empty.  Right now the patient complaining of epigastric pain but nausea is improved.  She does admit passing flatus.  Abdomen distended, soft with tenderness in the epigastrium but no peritoneal signs or masses palpable.  General surgery consultation has been requested at this time by the attending primary service.  Patient noted with blood pressure upon initial presentation to the ED of 213/95 has now improved with IV hydralazine.      Inpatient consult to Acute Care Surgery  Consult performed by: Celestino James PA-C  Consult ordered by: Iglesia Hernandez MD        Review of Systems    Conducted with the patient through translation provided by her son present in the room.  Constitutional:  + Activity change and loss of appetite.  No fever, claims chills.  HEENT: Patient denies any congestion, ear pain, rhinorrhea, sneezing and sore throat.    Eyes:  Negative for diplopia or eye pain.  Respiratory: She denies shortness of breath, no cough or  hemoptysis.  Cardiovascular: Denies chest pain and leg swelling.   Gastrointestinal: As described above in the HPI with positive abdominal pain, nausea and vomiting started yesterday afternoon around 4 PM.  Negative for abdominal distention, no blood in stool or diarrhea.   Endocrine: Negative for polyuria, or polydipsia.    Genitourinary: She denies any urinary symptoms including dysuria, frequency or urinary incontinence.  Musculoskeletal: Patient denies any back pain or muscle weakness.  Skin:  Negative for rash or ecchymosis.  Neurological: Headache when she initially presented, now resolved.. Negative for dizziness, speech difficulty, weakness, light-headedness and numbness.   Hematological:  Negative for adenopathy or bruising.  Psychiatric/Behavioral: Son does not note any mental status changes     Historical Information   Past Medical History:   Diagnosis Date    Diabetes mellitus (HCC)     HLD (hyperlipidemia)     Hypertension      Past Surgical History:   Procedure Laterality Date    CHOLECYSTECTOMY       Social History   Social History     Substance and Sexual Activity   Alcohol Use Not Currently     Social History     Substance and Sexual Activity   Drug Use Never     E-Cigarette/Vaping    E-Cigarette Use Never User      E-Cigarette/Vaping Substances     Social History     Tobacco Use   Smoking Status Former    Types: Cigarettes   Smokeless Tobacco Former     Family History: History reviewed. No pertinent family history.    Meds/Allergies   current meds:   Current Facility-Administered Medications   Medication Dose Route Frequency    Famotidine (PF) (PEPCID) injection 20 mg  20 mg Intravenous BID    heparin (porcine) subcutaneous injection 5,000 Units  5,000 Units Subcutaneous Q8H [facility]    hydrALAZINE (APRESOLINE) injection 5 mg  5 mg Intravenous Q6H PRN    insulin lispro (HumALOG/ADMELOG) 100 units/mL subcutaneous injection 1-6 Units  1-6 Units Subcutaneous Q6H [facility]    morphine injection 2 mg  2 mg  Intravenous Q4H PRN    morphine injection 4 mg  4 mg Intravenous Q4H PRN    multi-electrolyte (PLASMALYTE-A/ISOLYTE-S PH 7.4) IV solution  100 mL/hr Intravenous Continuous    ondansetron (ZOFRAN) injection 4 mg  4 mg Intravenous Q6H PRN    sodium chloride (PF) 0.9 % injection 3 mL  3 mL Intravenous Q1H PRN     No Known Allergies    Objective   First Vitals:   Blood Pressure: (!) 213/95 (08/13/24 2021)  Pulse: 82 (08/13/24 2021)  Temperature: 98.9 °F (37.2 °C) (08/13/24 2021)  Temp Source: Temporal (08/13/24 2021)  Respirations: 16 (08/13/24 2021)  Weight - Scale: 74.8 kg (165 lb) (08/13/24 2021)  SpO2: 97 % (08/13/24 2021)    Current Vitals:   Blood Pressure: 150/65 (08/14/24 0809)  Pulse: 87 (08/14/24 0809)  Temperature: (!) 97.4 °F (36.3 °C) (08/14/24 0809)  Temp Source: Temporal (08/14/24 0809)  Respirations: 16 (08/14/24 0809)  Weight - Scale: 74.8 kg (165 lb) (08/13/24 2021)  SpO2: 96 % (08/14/24 0809)      Intake/Output Summary (Last 24 hours) at 8/14/2024 1038  Last data filed at 8/14/2024 0040  Gross per 24 hour   Intake 100 ml   Output --   Net 100 ml       Invasive Devices       Peripheral Intravenous Line  Duration             Peripheral IV 08/13/24 Right Antecubital <1 day              Drain  Duration             NG/OG/Enteral Tube Nasogastric 14 Fr Left nare <1 day                    Physical Exam  Vitals reviewed.   Constitutional:       General: She is not in acute distress.     Appearance: She is ill-appearing. She is not toxic-appearing or diaphoretic.      Comments: The patient's son is present here at this time and provides translation, patient is Serbian-speaking only.   HENT:      Head: Normocephalic.      Nose: Nose normal.      Comments: NG tube left nostril taped at the 65 cm amada.  Very small amount of bilious light greenish drainage in the tubing but the canister is essentially empty.     Mouth/Throat:      Mouth: Mucous membranes are moist.      Pharynx: Oropharynx is clear.   Eyes:       General: No scleral icterus.     Conjunctiva/sclera: Conjunctivae normal.      Pupils: Pupils are equal, round, and reactive to light.   Neck:      Vascular: No carotid bruit.   Cardiovascular:      Rate and Rhythm: Normal rate and regular rhythm.      Heart sounds: Normal heart sounds. No murmur heard.     No gallop.   Pulmonary:      Effort: Pulmonary effort is normal.      Breath sounds: Normal breath sounds. No wheezing, rhonchi or rales.   Chest:      Chest wall: No tenderness.   Abdominal:      General: Bowel sounds are normal. There is distension.      Palpations: There is no mass.      Tenderness: There is abdominal tenderness. There is no right CVA tenderness, left CVA tenderness, guarding or rebound.      Hernia: No hernia is present.      Comments: Bowel sounds are not heard.  Abdomen moderately distended, tympanic.  Tenderness in the epigastrium without guarding or rebound.  Laparoscopic cholecystectomy scars appear well-healed.  Liver edge not palpable.  No hernias are felt.   Musculoskeletal:         General: No deformity or signs of injury. Normal range of motion.      Cervical back: Normal range of motion and neck supple. No tenderness.      Right lower leg: No edema.      Left lower leg: No edema.   Skin:     Capillary Refill: Capillary refill takes less than 2 seconds.      Coloration: Skin is not pale.      Findings: No bruising, erythema or rash.   Neurological:      General: No focal deficit present.      Mental Status: She is alert and oriented to person, place, and time. Mental status is at baseline.      Sensory: No sensory deficit.      Motor: No weakness.   Psychiatric:         Mood and Affect: Mood normal.         Behavior: Behavior normal.      Comments: Patient does not speak English.  All commands to the patient for physical examination or provided by the patient's son.       Lab Results: I have personally reviewed pertinent lab results.  , CBC:   Lab Results   Component Value Date     "WBC 8.91 08/14/2024    HGB 14.7 08/14/2024    HCT 42.9 08/14/2024    MCV 87 08/14/2024     08/14/2024    RBC 4.95 08/14/2024    MCH 29.7 08/14/2024    MCHC 34.3 08/14/2024    RDW 12.0 08/14/2024    MPV 9.4 08/14/2024    NRBC 0 08/13/2024   , CMP:   Lab Results   Component Value Date    SODIUM 135 08/14/2024    K 3.8 08/14/2024    CL 99 08/14/2024    CO2 27 08/14/2024    BUN 14 08/14/2024    CREATININE 0.73 08/14/2024    CALCIUM 8.8 08/14/2024    AST 17 08/13/2024    ALT 13 08/13/2024    ALKPHOS 59 08/13/2024    EGFR 81 08/14/2024   , Coagulation: No results found for: \"PT\", \"INR\", \"APTT\", Urinalysis:   Lab Results   Component Value Date    COLORU Light Yellow 08/14/2024    CLARITYU Clear 08/14/2024    SPECGRAV 1.015 08/14/2024    PHUR 7.0 08/14/2024    LEUKOCYTESUR Negative 08/14/2024    NITRITE Negative 08/14/2024    GLUCOSEU 1000 (1%) (A) 08/14/2024    KETONESU 10 (1+) (A) 08/14/2024    BILIRUBINUR Negative 08/14/2024    BLOODU Small (A) 08/14/2024   , Lipase:   Lab Results   Component Value Date    LIPASE 48 08/13/2024     Imaging: I have personally reviewed pertinent films in PACS    CT ABDOMEN AND PELVIS WITHOUT IV CONTRAST     INDICATION: epigastric pain.     COMPARISON: None.     TECHNIQUE: CT examination of the abdomen and pelvis was performed without intravenous contrast. Multiplanar 2D reformatted images were created from the source data.     This examination, like all CT scans performed in the [facility], was performed utilizing techniques to minimize radiation dose exposure, including the use of iterative reconstruction and automated exposure control. Radiation dose length   product (DLP) for this visit: 640 mGy-cm     Enteric Contrast: Not administered.     FINDINGS:     ABDOMEN     LOWER CHEST: No pleural or pericardial effusions. Parenchymal scarring in the lateral right lung base. Subpleural 4 mm left lower lobe nodular density noted, no routine follow-up imaging recommended " per current Fleischner Society guidelines for low risk   patient. Optional 12-month follow-up CT chest for high risk patient.     LIVER/BILIARY TREE: Unremarkable.     GALLBLADDER: Post cholecystectomy.     SPLEEN: Unremarkable.     PANCREAS: Unremarkable.     ADRENAL GLANDS: Unremarkable.     KIDNEYS/URETERS: No hydronephrosis or urinary tract calculi. Subcentimeter hypoattenuating renal lesion(s), too small to characterize but statistically likely benign, which do not warrant follow-up (Radiology June 2019).     STOMACH AND BOWEL: Stomach is markedly distended with air and fluid/debris. No intraluminal mass or irregular wall thickening. Abnormal distention of the proximal small bowel loops containing air, fluid, and debris measuring up to 3.5 cm in diameter   noted. Transition point in the right mid abdomen involving a mid to distal jejunal loop is seen with fecalization of the distended small bowel loop just prior to this region. Findings are consistent with small bowel obstruction. Terminal ileum and   appendix are grossly unremarkable.     APPENDIX: No findings to suggest appendicitis.     ABDOMINOPELVIC CAVITY: No ascites or loculated fluid collection. No pneumoperitoneum. No lymphadenopathy.     VESSELS: Extensive calcific atherosclerosis without abdominal aortic aneurysm.     PELVIS     REPRODUCTIVE ORGANS: Unremarkable for patient's age. A few punctate pelvic phleboliths.     URINARY BLADDER: Unremarkable.     ABDOMINAL WALL/INGUINAL REGIONS: Unremarkable.     BONES: No acute fracture or suspicious osseous lesion.     IMPRESSION:     Findings consistent with small bowel obstruction with transition point in the right mid abdomen involving a mid to distal jejunal loop with fecalization of the distended small bowel loop prior to this region. No free air, pneumatosis intestinalis, free   fluid, or loculated fluid collections in the abdomen/pelvis. Additional ancillary findings detailed above.       EKG,  Pathology, and Other Studies: I have personally reviewed pertinent reports.      Counseling / Coordination of Care  Total floor / unit time spent today 50 minutes.  Greater than 50% of total time was spent with the patient and / or family counseling and / or coordination of care.  A description of the counseling / coordination of care: Review of the patient's medical history, review of diagnostic imaging laboratory values, personal examination patient, reviewed current management with Dr. Jaylon Avila who will examine the patient later today all conducted in the surgical evaluation of the patient by this provider.    Celestino James PA-C

## 2024-08-14 NOTE — ED PROVIDER NOTES
History  Chief Complaint   Patient presents with    Abdominal Pain    Vomiting    Hypertension     Patient presents to ED from home accompanied by son w/c/o epigastric pain w/vomiting x 2hours, also reports systolic BP at home above 200. Patient denies CP/SOB, reports chills denies fever, reports normal bm @ 2pm today and was eating/drinking normally prior to vomiting, reports headache     74-year-old female presents with epigastric pain which started at approximately 1700 tonight.  Pain is nonradiating she denies prior history this was accompanied by nausea and vomiting.  She is not otherwise able to describe the emesis.  She did have a normal bowel movement earlier in the evening.  She noticed that her blood pressure was elevated this was accompanied by chills and she complained of a headache which is mild.  Patient denies any chest pain or shortness of breath there is no history of any visual changes speech changes no numbness or tingling she does not feel bloated no one else has been ill there is no recent antibiotic use and no travel.  Patient denies any lightheadedness or spinning sensation.  She has had no dysuria or increased urinary frequency no trauma or falls no lower extremity edema.  Past medical history diabetes hypertension hyperlipidemia meningioma endometrial hyperplasia and ulcerative colitis for which she takes balsalazide;   Surgical history is cholecystectomy breast cyst removal tubal ligation endometrial biopsy and lung surgery  Patient prefers to have son translate         Prior to Admission Medications   Prescriptions Last Dose Informant Patient Reported? Taking?   Diclofenac Sodium (VOLTAREN) 1 %   Yes Yes   Sig: AS DIRECTED EXTERNALLY TWICE 30 DAYS   Ozempic, 0.25 or 0.5 MG/DOSE, 2 MG/3ML injection pen   Yes Yes   Si.25 MG SUBCUTANEOUSLY WEEKLY   Trijardy XR 10-5-1000 MG TB24   Yes Yes   alendronate (FOSAMAX) 70 mg tablet   Yes Yes   Sig: Take 70 mg by mouth once a week   balsalazide  (COLAZAL) 750 mg capsule   Yes Yes   Sig: Take 750 mg by mouth 3 (three) times a day   glipiZIDE (GLUCOTROL XL) 10 mg 24 hr tablet   Yes Yes   Sig: Take 10 mg by mouth daily   losartan-hydrochlorothiazide (HYZAAR) 100-12.5 MG per tablet   Yes Yes   Sig: Take 1 tablet by mouth daily   metoprolol succinate (TOPROL-XL) 50 mg 24 hr tablet   Yes Yes   Sig: Take 50 mg by mouth daily   mometasone (NASONEX) 50 mcg/act nasal spray   Yes Yes   Sig: USE 2 SPRAYS INTO INTO EACH NOSTRIL EVERY DAY 90   omeprazole (PriLOSEC) 40 MG capsule   Yes Yes   Sig: Take 40 mg by mouth daily   rosuvastatin (CRESTOR) 40 MG tablet   Yes Yes   Sig: Take 40 mg by mouth daily      Facility-Administered Medications: None       Past Medical History:   Diagnosis Date    Diabetes mellitus (HCC)     Hypertension        Past Surgical History:   Procedure Laterality Date    CHOLECYSTECTOMY         History reviewed. No pertinent family history.  I have reviewed and agree with the history as documented.    E-Cigarette/Vaping    E-Cigarette Use Never User      E-Cigarette/Vaping Substances     Social History     Tobacco Use    Smoking status: Former     Types: Cigarettes    Smokeless tobacco: Former   Vaping Use    Vaping status: Never Used   Substance Use Topics    Alcohol use: Not Currently    Drug use: Never       Review of Systems   Constitutional:  Positive for activity change, appetite change and chills. Negative for fever.   HENT:  Negative for congestion, ear pain, rhinorrhea, sneezing and sore throat.    Eyes:  Negative for discharge.   Respiratory:  Negative for cough and shortness of breath.    Cardiovascular:  Negative for chest pain and leg swelling.   Gastrointestinal:  Positive for abdominal pain, nausea and vomiting. Negative for abdominal distention, blood in stool and diarrhea.   Endocrine: Negative for polyuria.   Genitourinary:  Negative for difficulty urinating, dysuria, frequency and urgency.   Musculoskeletal:  Negative for back pain  and myalgias.   Skin:  Negative for rash.   Neurological:  Positive for headaches. Negative for dizziness, speech difficulty, weakness, light-headedness and numbness.   Hematological:  Negative for adenopathy.   Psychiatric/Behavioral:  Negative for confusion.    All other systems reviewed and are negative.      Physical Exam  Physical Exam  Vitals and nursing note reviewed.   Constitutional:       General: She is not in acute distress.     Appearance: She is well-developed. She is not ill-appearing, toxic-appearing or diaphoretic.   HENT:      Head: Normocephalic and atraumatic.      Right Ear: Tympanic membrane and external ear normal.      Left Ear: Tympanic membrane and external ear normal.      Nose: Nose normal.      Mouth/Throat:      Mouth: Mucous membranes are moist.      Pharynx: No oropharyngeal exudate or posterior oropharyngeal erythema.   Eyes:      General:         Right eye: No discharge.         Left eye: No discharge.      Extraocular Movements: Extraocular movements intact.      Conjunctiva/sclera: Conjunctivae normal.      Pupils: Pupils are equal, round, and reactive to light.   Neck:      Comments: No midline or paraspinous tenderness  Cardiovascular:      Rate and Rhythm: Normal rate and regular rhythm.      Pulses: Normal pulses.      Heart sounds: Normal heart sounds.   Pulmonary:      Effort: Pulmonary effort is normal. No respiratory distress.      Breath sounds: Normal breath sounds.   Abdominal:      General: Bowel sounds are normal. There is no distension.      Palpations: Abdomen is soft.      Tenderness: There is abdominal tenderness (epigastric). There is no guarding or rebound.      Comments: Back no midline or CVA tenderness   Musculoskeletal:         General: No deformity. Normal range of motion.      Cervical back: Normal range of motion and neck supple. Tenderness present. No rigidity.      Right lower leg: No edema.      Left lower leg: No edema.   Lymphadenopathy:       Cervical: No cervical adenopathy.   Skin:     General: Skin is warm and dry.   Neurological:      General: No focal deficit present.      Mental Status: She is alert and oriented to person, place, and time.      Cranial Nerves: No cranial nerve deficit.      Sensory: No sensory deficit.      Motor: No weakness or abnormal muscle tone.      Coordination: Coordination normal.   Psychiatric:         Mood and Affect: Mood normal.         Vital Signs  ED Triage Vitals [08/13/24 2021]   Temperature Pulse Respirations Blood Pressure SpO2   98.9 °F (37.2 °C) 82 16 (!) 213/95 97 %      Temp Source Heart Rate Source Patient Position - Orthostatic VS BP Location FiO2 (%)   Temporal Monitor -- -- --      Pain Score       5           Vitals:    08/13/24 2021 08/13/24 2203   BP: (!) 213/95 (!) 182/83   Pulse: 82 91         Visual Acuity      ED Medications  Medications   sodium chloride (PF) 0.9 % injection 3 mL (has no administration in time range)   lidocaine (URO-JET) 2 % urethral/mucosal gel 1 Application (has no administration in time range)   ondansetron (ZOFRAN) injection 4 mg (4 mg Intravenous Given 8/13/24 2258)   multi-electrolyte (ISOLYTE-S PH 7.4) bolus 500 mL (500 mL Intravenous New Bag 8/13/24 2259)   Famotidine (PF) (PEPCID) injection 40 mg (40 mg Intravenous Given 8/14/24 0024)   acetaminophen (Ofirmev) injection 1,000 mg (1,000 mg Intravenous New Bag 8/14/24 0025)       Diagnostic Studies  Results Reviewed       Procedure Component Value Units Date/Time    UA w Reflex to Microscopic w Reflex to Culture [[id number]]     Lab Status: No result Specimen: Urine     HS Troponin I 2hr [[id number]] Collected: 08/13/24 2254    Lab Status: Final result Specimen: Blood from Arm, Right Updated: 08/13/24 2329     hs TnI 2hr <2 ng/L      Delta 2hr hsTnI --    HS Troponin I 4hr [[id number]]     Lab Status: No result Specimen: Blood     HS Troponin 0hr (reflex protocol) [[id number]]  (Normal) Collected: 08/13/24 2101    Lab Status:  Final result Specimen: Blood from Arm, Right Updated: 08/13/24 2137     hs TnI 0hr <2 ng/L     B-Type Natriuretic Peptide(BNP) [[id number]]  (Normal) Collected: 08/13/24 2101    Lab Status: Final result Specimen: Blood from Arm, Right Updated: 08/13/24 2136     BNP 30 pg/mL     Comprehensive metabolic panel [[id number]]  (Abnormal) Collected: 08/13/24 2101    Lab Status: Final result Specimen: Blood from Arm, Right Updated: 08/13/24 2129     Sodium 135 mmol/L      Potassium 4.0 mmol/L      Chloride 94 mmol/L      CO2 28 mmol/L      ANION GAP 13 mmol/L      BUN 16 mg/dL      Creatinine 0.71 mg/dL      Glucose 191 mg/dL      Calcium 9.8 mg/dL      AST 17 U/L      ALT 13 U/L      Alkaline Phosphatase 59 U/L      Total Protein 8.0 g/dL      Albumin 5.2 g/dL      Total Bilirubin 0.53 mg/dL      eGFR 84 ml/min/1.73sq m     Narrative:      [employer] guidelines for Chronic Kidney Disease (CKD):     Stage 1 with normal or high GFR (GFR > 90 mL/min/1.73 square meters)    Stage 2 Mild CKD (GFR = 60-89 mL/min/1.73 square meters)    Stage 3A Moderate CKD (GFR = 45-59 mL/min/1.73 square meters)    Stage 3B Moderate CKD (GFR = 30-44 mL/min/1.73 square meters)    Stage 4 Severe CKD (GFR = 15-29 mL/min/1.73 square meters)    Stage 5 End Stage CKD (GFR <15 mL/min/1.73 square meters)  Note: GFR calculation is accurate only with a steady state creatinine    Lipase [[id number]]  (Normal) Collected: 08/13/24 2101    Lab Status: Final result Specimen: Blood from Arm, Right Updated: 08/13/24 2129     Lipase 48 u/L     Magnesium [[id number]]  (Normal) Collected: 08/13/24 2101    Lab Status: Final result Specimen: Blood from Arm, Right Updated: 08/13/24 2129     Magnesium 2.3 mg/dL     CBC and differential [[id number]]  (Abnormal) Collected: 08/13/24 2101    Lab Status: Final result Specimen: Blood from Arm, Right Updated: 08/13/24 2114     WBC 8.58 Thousand/uL      RBC 5.37 Million/uL      Hemoglobin 16.1 g/dL       Hematocrit 46.4 %      MCV 86 fL      MCH 30.0 pg      MCHC 34.7 g/dL      RDW 12.2 %      MPV 9.6 fL      Platelets 256 Thousands/uL      nRBC 0 /100 WBCs      Segmented % 79 %      Immature Grans % 0 %      Lymphocytes % 17 %      Monocytes % 4 %      Eosinophils Relative 0 %      Basophils Relative 0 %      Absolute Neutrophils 6.78 Thousands/µL      Absolute Immature Grans 0.03 Thousand/uL      Absolute Lymphocytes 1.42 Thousands/µL      Absolute Monocytes 0.30 Thousand/µL      Eosinophils Absolute 0.03 Thousand/µL      Basophils Absolute 0.02 Thousands/µL                    CT abdomen pelvis wo contrast   Final Result by Jose Ramon Augustine MD (08/14 0040)      Findings consistent with small bowel obstruction with transition point in the right mid abdomen involving a mid to distal jejunal loop with fecalization of the distended small bowel loop prior to this region. No free air, pneumatosis intestinalis, free    fluid, or loculated fluid collections in the abdomen/pelvis. Additional ancillary findings detailed above.      Workstation performed: [id number]         X-ray chest 1 view portable   ED Interpretation by Kassie Green MD (08/13 7231)   Per my independent interpretation. Radiologist to provide formal read. Elevation of rt hemidiaphragm no infiltrate no effusion                 Procedures  ECG 12 Lead Documentation Only    Date/Time: 8/13/2024 8:40 PM    Performed by: Kassie Green MD  Authorized by: Kassie Green MD    Indications / Diagnosis:  Epigastric pain  ECG reviewed by me, the ED Provider: yes    Patient location:  ED  Previous ECG:     Previous ECG:  Unavailable (no previous)  Rate:     ECG rate:  85    ECG rate assessment: normal    Rhythm:     Rhythm: sinus rhythm    QRS:     QRS axis:  Normal  Comments:       no acute ischemic changes           ED Course  ED Course as of 08/14/24 0102   Tue Aug 13, 2024   2311 Patient declines CT scan with IV contrast (does not  like how IV contrast makes her feel) will proceed with CT a/p without   Wed Aug 14, 2024   0042 Recheck bp 191/76 headache resolved epigastric pain better nausea resolved on re-exam very mild abdominal pain   0058 Reviewed CT scan with son and patient recommend NG tube insertion hospitalization; discussed incidental pulmonary nodule with patient and son secondary to prior lung surgery recommend followup in 12 months   0101 Dr. Hernandez recommends inpatient to his service per  Dr. Avila general surg will see patient in AM               HEART Risk Score      Flowsheet Row Most Recent Value   Heart Score Risk Calculator    History 1 Filed at: 08/13/2024 2344   ECG 0 Filed at: 08/13/2024 2344   Age 2 Filed at: 08/13/2024 2344   Risk Factors 2 Filed at: 08/13/2024 2344   Troponin 0 Filed at: 08/13/2024 2344   HEART Score 5 Filed at: 08/13/2024 2344                          SBIRT 22yo+      Flowsheet Row Most Recent Value   Initial Alcohol Screen: US AUDIT-C     1. How often do you have a drink containing alcohol? 0 Filed at: 08/13/2024 2027   2. How many drinks containing alcohol do you have on a typical day you are drinking?  0 Filed at: 08/13/2024 2027   3b. FEMALE Any Age, or MALE 65+: How often do you have 4 or more drinks on one occassion? 0 Filed at: 08/13/2024 2027   Audit-C Score 0 Filed at: 08/13/2024 2027   PAYAL: How many times in the past year have you...    Used an illegal drug or used a prescription medication for non-medical reasons? Never Filed at: 08/13/2024 2027                      Medical Decision Making  Mdm: 74-year-old female presents with epigastric pain accompanied by nausea and vomiting.  Pain is reproducible.  Patient will be evaluated for acute coronary syndrome, transaminitis pancreatitis diverticulitis initiate symptomatic management with antiemetics IV fluids and famotidine.  Will administer IV Tylenol for pain management at this time neurologic exam is nonfocal.     Amount and/or Complexity  of Data Reviewed  Radiology: ordered and independent interpretation performed.    Risk  Prescription drug management.                 Disposition  Final diagnoses:   Abdominal pain   Nausea and vomiting   SBO (small bowel obstruction) (HCC)   Pulmonary nodule - LLL 4mm recc 12 month f/u      Time reflects when diagnosis was documented in both MDM as applicable and the Disposition within this note       Time User Action Codes Description Comment    8/14/2024 12:37 AM Kassie Green Add [R10.9] Abdominal pain     8/14/2024 12:37 AM Kassie Green Add [R11.2] Nausea and vomiting     8/14/2024 12:49 AM Kassie Green Add [K56.609] SBO (small bowel obstruction) (HCC)     8/14/2024 12:50 AM Kassie Green Add [R91.1] Pulmonary nodule     8/14/2024 12:50 AM Kassie Green Modify [R91.1] Pulmonary nodule LLL 4mm recc 12 month f/u           ED Disposition       ED Disposition   Admit    Condition   Stable    Date/Time   Wed Aug 14, 2024 0049    Comment   Case was discussed with Dr. Hernandez  and the patient's admission status was agreed to be Admission Status: inpatient status to the service of Dr. Hernandez .               Follow-up Information    None         Patient's Medications   Discharge Prescriptions    No medications on file       No discharge procedures on file.    PDMP Review       None            ED Provider  Electronically Signed by             Kassie Green MD  08/14/24 0035       Kassie Green MD  08/14/24 0102

## 2024-08-15 ENCOUNTER — APPOINTMENT (INPATIENT)
Dept: RADIOLOGY | Facility: HOSPITAL | Age: 74
DRG: 389 | End: 2024-08-15
Payer: COMMERCIAL

## 2024-08-15 LAB
ANION GAP SERPL CALCULATED.3IONS-SCNC: 11 MMOL/L (ref 4–13)
BASOPHILS # BLD AUTO: 0.02 THOUSANDS/ÂΜL (ref 0–0.1)
BASOPHILS NFR BLD AUTO: 0 % (ref 0–1)
BUN SERPL-MCNC: 14 MG/DL (ref 5–25)
CALCIUM SERPL-MCNC: 7.9 MG/DL (ref 8.4–10.2)
CHLORIDE SERPL-SCNC: 101 MMOL/L (ref 96–108)
CO2 SERPL-SCNC: 24 MMOL/L (ref 21–32)
CREAT SERPL-MCNC: 0.65 MG/DL (ref 0.6–1.3)
EOSINOPHIL # BLD AUTO: 0.05 THOUSAND/ÂΜL (ref 0–0.61)
EOSINOPHIL NFR BLD AUTO: 1 % (ref 0–6)
ERYTHROCYTE [DISTWIDTH] IN BLOOD BY AUTOMATED COUNT: 11.9 % (ref 11.6–15.1)
EST. AVERAGE GLUCOSE BLD GHB EST-MCNC: 163 MG/DL
GFR SERPL CREATININE-BSD FRML MDRD: 87 ML/MIN/1.73SQ M
GLUCOSE SERPL-MCNC: 109 MG/DL (ref 65–140)
GLUCOSE SERPL-MCNC: 209 MG/DL (ref 65–140)
GLUCOSE SERPL-MCNC: 78 MG/DL (ref 65–140)
GLUCOSE SERPL-MCNC: 88 MG/DL (ref 65–140)
GLUCOSE SERPL-MCNC: 95 MG/DL (ref 65–140)
HBA1C MFR BLD: 7.3 %
HCT VFR BLD AUTO: 41.7 % (ref 34.8–46.1)
HGB BLD-MCNC: 14 G/DL (ref 11.5–15.4)
IMM GRANULOCYTES # BLD AUTO: 0.03 THOUSAND/UL (ref 0–0.2)
IMM GRANULOCYTES NFR BLD AUTO: 1 % (ref 0–2)
LYMPHOCYTES # BLD AUTO: 2.34 THOUSANDS/ÂΜL (ref 0.6–4.47)
LYMPHOCYTES NFR BLD AUTO: 40 % (ref 14–44)
MAGNESIUM SERPL-MCNC: 2.2 MG/DL (ref 1.9–2.7)
MCH RBC QN AUTO: 29.5 PG (ref 26.8–34.3)
MCHC RBC AUTO-ENTMCNC: 33.6 G/DL (ref 31.4–37.4)
MCV RBC AUTO: 88 FL (ref 82–98)
MONOCYTES # BLD AUTO: 0.55 THOUSAND/ÂΜL (ref 0.17–1.22)
MONOCYTES NFR BLD AUTO: 9 % (ref 4–12)
NEUTROPHILS # BLD AUTO: 2.88 THOUSANDS/ÂΜL (ref 1.85–7.62)
NEUTS SEG NFR BLD AUTO: 49 % (ref 43–75)
NRBC BLD AUTO-RTO: 0 /100 WBCS
PHOSPHATE SERPL-MCNC: 2.6 MG/DL (ref 2.3–4.1)
PLATELET # BLD AUTO: 194 THOUSANDS/UL (ref 149–390)
PMV BLD AUTO: 9.3 FL (ref 8.9–12.7)
POTASSIUM SERPL-SCNC: 3.8 MMOL/L (ref 3.5–5.3)
RBC # BLD AUTO: 4.74 MILLION/UL (ref 3.81–5.12)
SODIUM SERPL-SCNC: 136 MMOL/L (ref 135–147)
WBC # BLD AUTO: 5.87 THOUSAND/UL (ref 4.31–10.16)

## 2024-08-15 PROCEDURE — 99232 SBSQ HOSP IP/OBS MODERATE 35: CPT | Performed by: NURSE PRACTITIONER

## 2024-08-15 PROCEDURE — 82948 REAGENT STRIP/BLOOD GLUCOSE: CPT

## 2024-08-15 PROCEDURE — 85025 COMPLETE CBC W/AUTO DIFF WBC: CPT

## 2024-08-15 PROCEDURE — 83735 ASSAY OF MAGNESIUM: CPT

## 2024-08-15 PROCEDURE — 99232 SBSQ HOSP IP/OBS MODERATE 35: CPT

## 2024-08-15 PROCEDURE — 80048 BASIC METABOLIC PNL TOTAL CA: CPT

## 2024-08-15 PROCEDURE — 97166 OT EVAL MOD COMPLEX 45 MIN: CPT

## 2024-08-15 PROCEDURE — 84100 ASSAY OF PHOSPHORUS: CPT

## 2024-08-15 PROCEDURE — 97162 PT EVAL MOD COMPLEX 30 MIN: CPT

## 2024-08-15 PROCEDURE — 83036 HEMOGLOBIN GLYCOSYLATED A1C: CPT | Performed by: FAMILY MEDICINE

## 2024-08-15 PROCEDURE — NC001 PR NO CHARGE

## 2024-08-15 PROCEDURE — 74019 RADEX ABDOMEN 2 VIEWS: CPT

## 2024-08-15 RX ADMIN — HEPARIN SODIUM 5000 UNITS: 5000 INJECTION INTRAVENOUS; SUBCUTANEOUS at 05:31

## 2024-08-15 RX ADMIN — FAMOTIDINE 20 MG: 10 INJECTION, SOLUTION INTRAVENOUS at 17:23

## 2024-08-15 RX ADMIN — INSULIN LISPRO 2 UNITS: 100 INJECTION, SOLUTION INTRAVENOUS; SUBCUTANEOUS at 18:14

## 2024-08-15 RX ADMIN — HEPARIN SODIUM 5000 UNITS: 5000 INJECTION INTRAVENOUS; SUBCUTANEOUS at 22:11

## 2024-08-15 RX ADMIN — DIATRIZOATE MEGLUMINE AND DIATRIZOATE SODIUM 120 ML: 660; 100 LIQUID ORAL; RECTAL at 08:58

## 2024-08-15 RX ADMIN — SODIUM CHLORIDE, SODIUM GLUCONATE, SODIUM ACETATE, POTASSIUM CHLORIDE AND MAGNESIUM CHLORIDE 100 ML/HR: 526; 502; 368; 37; 30 INJECTION, SOLUTION INTRAVENOUS at 17:27

## 2024-08-15 RX ADMIN — FAMOTIDINE 20 MG: 10 INJECTION, SOLUTION INTRAVENOUS at 09:04

## 2024-08-15 RX ADMIN — HEPARIN SODIUM 5000 UNITS: 5000 INJECTION INTRAVENOUS; SUBCUTANEOUS at 14:20

## 2024-08-15 NOTE — PLAN OF CARE
Problem: PAIN - ADULT  Goal: Verbalizes/displays adequate comfort level or baseline comfort level  Description: Interventions:  - Encourage patient to monitor pain and request assistance  - Assess pain using appropriate pain scale  - Administer analgesics based on type and severity of pain and evaluate response  - Implement non-pharmacological measures as appropriate and evaluate response  - Consider cultural and social influences on pain and pain management  - Notify physician/advanced practitioner if interventions unsuccessful or patient reports new pain  Outcome: Progressing     Problem: INFECTION - ADULT  Goal: Absence or prevention of progression during hospitalization  Description: INTERVENTIONS:  - Assess and monitor for signs and symptoms of infection  - Monitor lab/diagnostic results  - Monitor all insertion sites, i.e. indwelling lines, tubes, and drains  - Monitor endotracheal if appropriate and nasal secretions for changes in amount and color  - [place] appropriate cooling/warming therapies per order  - Administer medications as ordered  - Instruct and encourage patient and family to use good hand hygiene technique  - Identify and instruct in appropriate isolation precautions for identified infection/condition  Outcome: Progressing     Problem: SAFETY ADULT  Goal: Patient will remain free of falls  Description: INTERVENTIONS:  - Educate patient/family on patient safety including physical limitations  - Instruct patient to call for assistance with activity   - Consult OT/PT to assist with strengthening/mobility   - Keep Call bell within reach  - Keep bed low and locked with side rails adjusted as appropriate  - Keep care items and personal belongings within reach  - Initiate and maintain comfort rounds  - Make Fall Risk Sign visible to staff  - Offer Toileting every 2 Hours, in advance of need  - Initiate/Maintain bed/chair alarm  - Obtain necessary fall risk management equipment: nonskid socks  -  Apply yellow socks and bracelet for high fall risk patients  - Consider moving patient to room near nurses station  Outcome: Progressing  Goal: Maintain or return to baseline ADL function  Description: INTERVENTIONS:  -  Assess patient's ability to carry out ADLs; assess patient's baseline for ADL function and identify physical deficits which impact ability to perform ADLs (bathing, care of mouth/teeth, toileting, grooming, dressing, etc.)  - Assess/evaluate cause of self-care deficits   - Assess range of motion  - Assess patient's mobility; develop plan if impaired  - Assess patient's need for assistive devices and provide as appropriate  - Encourage maximum independence but intervene and supervise when necessary  - Involve family in performance of ADLs  - Assess for home care needs following discharge   - Consider OT consult to assist with ADL evaluation and planning for discharge  - Provide patient education as appropriate  Outcome: Progressing  Goal: Maintains/Returns to pre admission functional level  Description: INTERVENTIONS:  - Perform AM-PAC 6 Click Basic Mobility/ Daily Activity assessment daily.  - Set and communicate daily mobility goal to care team and patient/family/caregiver.   - Collaborate with rehabilitation services on mobility goals if consulted  - Perform Range of Motion 3 times a day.  - Reposition patient every 3 hours.  - Dangle patient 3 times a day  - Stand patient 3 times a day  - Ambulate patient 3 times a day  - Out of bed to chair 3 times a day   - Out of bed for meals 3 times a day  - Out of bed for toileting  - Record patient progress and toleration of activity level   Outcome: Progressing     Problem: DISCHARGE PLANNING  Goal: Discharge to home or other facility with appropriate resources  Description: INTERVENTIONS:  - Identify barriers to discharge w/patient and caregiver  - Arrange for needed discharge resources and transportation as appropriate  - Identify discharge learning  needs (meds, wound care, etc.)  - Arrange for interpretive services to assist at discharge as needed  - Refer to Case Management Department for coordinating discharge planning if the patient needs post-hospital services based on physician/advanced practitioner order or complex needs related to functional status, cognitive ability, or social support system  Outcome: Progressing     Problem: Knowledge Deficit  Goal: Patient/family/caregiver demonstrates understanding of disease process, treatment plan, medications, and discharge instructions  Description: Complete learning assessment and assess knowledge base.  Interventions:  - Provide teaching at level of understanding  - Provide teaching via preferred learning methods  Outcome: Progressing     Problem: GASTROINTESTINAL - ADULT  Goal: Minimal or absence of nausea and/or vomiting  Description: INTERVENTIONS:  - Administer IV fluids if ordered to ensure adequate hydration  - Maintain NPO status until nausea and vomiting are resolved  - Nasogastric tube if ordered  - Administer ordered antiemetic medications as needed  - Provide nonpharmacologic comfort measures as appropriate  - Advance diet as tolerated, if ordered  - Consider nutrition services referral to assist patient with adequate nutrition and appropriate food choices  Outcome: Progressing  Goal: Maintains or returns to baseline bowel function  Description: INTERVENTIONS:  - Assess bowel function  - Encourage oral fluids to ensure adequate hydration  - Administer IV fluids if ordered to ensure adequate hydration  - Administer ordered medications as needed  - Encourage mobilization and activity  - Consider nutritional services referral to assist patient with adequate nutrition and appropriate food choices  Outcome: Progressing  Goal: Maintains adequate nutritional intake  Description: INTERVENTIONS:  - Monitor percentage of each meal consumed  - Identify factors contributing to decreased intake, treat as  appropriate  - Assist with meals as needed  - Monitor I&O, weight, and lab values if indicated  - Obtain nutrition services referral as needed  Outcome: Progressing  Goal: Oral mucous membranes remain intact  Description: INTERVENTIONS  - Assess oral mucosa and hygiene practices  - Implement preventative oral hygiene regimen  - Implement oral medicated treatments as ordered  - Initiate Nutrition services referral as needed  Outcome: Progressing

## 2024-08-15 NOTE — PROGRESS NOTES
[facility]  Progress Note  Name: Xena Holly I MRN: [id number]  Unit/Bed#: 403-01I Date of Admission: 8/13/2024   Date of Service: 8/13/2024  I Hospital Day: 1    * SBO (small bowel obstruction) (HCC)  Assessment & Plan  Presented with abdominal pain nausea with imaging findings as follow   CT Abd: Findings consistent with small bowel obstruction with transition point in the right mid abdomen involving a mild to distal jejunal loop with visualization of the distended small bowel loops prior to this regionGeneral surgery consulted and following   continue NPO advance diet per surgery recommendation  Continue NGT   Proceeding with a Gastrografin challenge followed by imaging study to see if NG tube can be discontinued  Continue current pain medication        Gastroesophageal reflux disease without esophagitis  Assessment & Plan  Continue pepcid 20mg iv bid    Type 2 diabetes mellitus without complication, without long-term current use of insulin ([facility])  Assessment & Plan  Known history   Hold home medication   Continue SSI and blood glucose monitoring every 6 hours can adjust when patient's no longer n.p.o. to ACHS     Essential hypertension  Assessment & Plan  Hold oral home medication in setting of n.p.o. status   Monitor blood pressure   Continue Hydralazine prn.  For SBP >160          VTE Pharmacologic Prophylaxis: VTE Score: 4 Moderate Risk (Score 3-4) - Pharmacological DVT Prophylaxis Ordered: heparin.    Mobility:   Basic Mobility Inpatient Raw Score: 21  JH-HLM Goal: 6: Walk 10 steps or more  JH-HLM Achieved: 8: Walk 250 feet ot more  JH-HLM Goal achieved. Continue to encourage appropriate mobility.    Patient Centered Rounds: I performed bedside rounds with nursing staff today.   Discussions with Specialists or Other Care Team Provider: Surgery note reviewed    Education and Discussions with Family / Patient: Patient declined call to .     Total Time Spent on Date of  Encounter in care of patient: 38 mins. This time was spent on one or more of the following: performing physical exam; counseling and coordination of care; obtaining or reviewing history; documenting in the medical record; reviewing/ordering tests, medications or procedures; communicating with other healthcare professionals and discussing with patient's family/caregivers.    Current Length of Stay: 1 day(s)  Current Patient Status: Inpatient   Certification Statement: The patient will continue to require additional inpatient hospital stay due to mild bowel obstruction with need for further acute monitoring and intervention  Discharge Plan: Anticipate discharge in 48 hrs to home.    Code Status: Level 1 - Full Code    Subjective:   Patient having some irritation with the NG tube but otherwise has no complaints denies abdominal pain nausea at this time understands the need for further workup and had no further questions at this time.    Objective:     Vitals:   Temp (24hrs), Av.2 °F (36.8 °C), Min:98 °F (36.7 °C), Max:98.4 °F (36.9 °C)    Temp:  [98 °F (36.7 °C)-98.4 °F (36.9 °C)] 98 °F (36.7 °C)  HR:  [84-86] 84  Resp:  [17-20] 20  BP: (165-176)/(67-90) 168/67  SpO2:  [95 %-98 %] 95 %  Body mass index is 26.85 kg/m².     Input and Output Summary (last 24 hours):     Intake/Output Summary (Last 24 hours) at 8/15/2024 1014  Last data filed at 2024 1700  Gross per 24 hour   Intake 0 ml   Output 400 ml   Net -400 ml       Physical Exam:   Physical Exam  Constitutional:       Appearance: She is well-developed.   HENT:      Head: Normocephalic and atraumatic.   Eyes:      Conjunctiva/sclera: Conjunctivae normal.   Cardiovascular:      Rate and Rhythm: Normal rate and regular rhythm.      Heart sounds: Normal heart sounds.   Pulmonary:      Effort: Pulmonary effort is normal.      Breath sounds: Normal breath sounds.   Abdominal:      General: Bowel sounds are increased.      Palpations: Abdomen is soft.    Musculoskeletal:         General: Normal range of motion.      Cervical back: Normal range of motion.   Skin:     General: Skin is warm and dry.   Neurological:      Mental Status: She is alert and oriented to person, place, and time.   Psychiatric:         Behavior: Behavior normal.           Additional Data:     Labs:  Results from last 7 days   Lab Units 08/15/24  0511   WBC Thousand/uL 5.87   HEMOGLOBIN g/dL 14.0   HEMATOCRIT % 41.7   PLATELETS Thousands/uL 194   SEGS PCT % 49   LYMPHO PCT % 40   MONO PCT % 9   EOS PCT % 1     Results from last 7 days   Lab Units 08/15/24  0511 08/14/24  0424 08/13/24  2101   SODIUM mmol/L 136   < > 135   POTASSIUM mmol/L 3.8   < > 4.0   CHLORIDE mmol/L 101   < > 94*   CO2 mmol/L 24   < > 28   BUN mg/dL 14   < > 16   CREATININE mg/dL 0.65   < > 0.71   ANION GAP mmol/L 11   < > 13   CALCIUM mg/dL 7.9*   < > 9.8   ALBUMIN g/dL  --   --  5.2*   TOTAL BILIRUBIN mg/dL  --   --  0.53   ALK PHOS U/L  --   --  59   ALT U/L  --   --  13   AST U/L  --   --  17   GLUCOSE RANDOM mg/dL 78   < > 191*    < > = values in this interval not displayed.         Results from last 7 days   Lab Units 08/15/24  0529 08/15/24  0013 08/14/24  1822 08/14/24  1147 08/14/24  0745   POC GLUCOSE mg/dl 95 88 110 93 161*               Lines/Drains:  Invasive Devices       Peripheral Intravenous Line  Duration             Peripheral IV 08/13/24 Right Antecubital 1 day              Drain  Duration             NG/OG/Enteral Tube Nasogastric 14 Fr Left nare 1 day                          Imaging: Reviewed radiology reports from this admission including: abdominal/pelvic CT    Recent Cultures (last 7 days):         Last 24 Hours Medication List:   Current Facility-Administered Medications   Medication Dose Route Frequency Provider Last Rate    Famotidine (PF)  20 mg Intravenous BID Iglesia Hernandez MD      heparin (porcine)  5,000 Units Subcutaneous Q8H [facility] Iglesia Hernandez MD      hydrALAZINE  5 mg  Intravenous Q6H PRN Iglesia Hernandez MD      insulin lispro  1-6 Units Subcutaneous Q6H [facility] Iglesia Hernandez MD      morphine injection  2 mg Intravenous Q4H PRN Iglesia Hernandez MD      morphine injection  4 mg Intravenous Q4H PRN Iglesia Hernandez MD      multi-electrolyte  100 mL/hr Intravenous Continuous Iglesia Hernandez  mL/hr (08/14/24 1339)    ondansetron  4 mg Intravenous Q6H PRN Iglesia Hernandez MD      phenol  1 spray Mouth/Throat Q2H PRN ABIGAIL Chin      sodium chloride (PF)  3 mL Intravenous Q1H PRN Joaquin Godoy DO          Today, Patient Was Seen By: ABIGAIL Salinas    **Please Note: This note may have been constructed using a voice recognition system.**

## 2024-08-15 NOTE — QUICK NOTE
6-hour delayed imaging on Gastrografin challenge study which is completed, films reviewed.  Contrast made its way to the colon.  NG tube has been clamped now for about 7 hours.  Discussed with Dr. Jaylon Avila, will discontinue NG tube at this time and advance clear liquids as tolerated tonight.  If she does well then will advance surgical soft light meal tomorrow morning and possibly discharge home tomorrow.    No plan for any surgical intervention at this time, will continue to follow.  Repeat a.m. labs including CBC, BMP and magnesium.    Celestino James PA-C

## 2024-08-15 NOTE — PROGRESS NOTES
Progress Note - General Surgery   Xena Holly 74 y.o. female MRN: [id number]  Unit/Bed#: 403-01 Encounter: [id number]    Assessment:    74-year-old Thai-speaking female presented to the ED 2 nights ago with epigastric pain, nausea and vomiting.  CT scan showed small bowel obstruction with transition zone.  NG tube was placed in the ED, right now about 250 mL bilious drainage in canister.  Most likely cause of her first episode SBO would be probable abdominal adhesions from prior abdominal surgeries including laparoscopic cholecystectomy.     was used for Thai interpretation to communicate with the patient.  She does understand some brief broken English.    Labs reviewed, essentially normal.    WBC 5.87  BMP with normal electrolytes and kidney function  Magnesium 2.2  Phosphorus 2.6  Fingerstick glucose 95    Vital signs stable, remains afebrile.    Plan:  NG tube  SUSI's  Will proceed with obtaining Gastrografin challenge today, nursing to instill 120 mL of Gastrografin with 50 mL sterile water and clamp NG tube then obtain delayed imaging by radiology in 6 hours.  If no obstruction, then will discontinue NG tube later this afternoon.  Maintain IV fluids for hydration  Chloraseptic throat spray every 2 hours as needed dry mouth  Analgesics and antiemetics as ordered as needed  Will discuss and update with Dr. Jaylon Avila who will examine the patient later today  Remainder of medical management per the attending primary service    Subjective/Objective   Chief Complaint: Abdominal pain better, less nausea.  Feels less bloated.    Subjective: No overnight events.  NG tube draining bilious fluid in canister.  Patient feels less distended, she does admit some small amounts of flatus but no bowel movement.    Scheduled Meds:  Current Facility-Administered Medications   Medication Dose Route Frequency Provider Last Rate    Famotidine (PF)  20 mg Intravenous BID Iglesia Hernandez MD      heparin  "(porcine)  5,000 Units Subcutaneous Q8H [facility] Iglesia Hernandez MD      hydrALAZINE  5 mg Intravenous Q6H PRN Iglesia Hernandez MD      insulin lispro  1-6 Units Subcutaneous Q6H DICK Hernandez MD      morphine injection  2 mg Intravenous Q4H PRN Iglesia Hernandez MD      morphine injection  4 mg Intravenous Q4H PRN Iglesia Hernandez MD      multi-electrolyte  100 mL/hr Intravenous Continuous Iglesia Hernandez  mL/hr (08/14/24 1339)    ondansetron  4 mg Intravenous Q6H PRN Iglesia Hernandez MD      phenol  1 spray Mouth/Throat Q2H PRN ABIGAIL Chin      sodium chloride (PF)  3 mL Intravenous Q1H PRN Joaquin Godoy DO       Continuous Infusions:multi-electrolyte, 100 mL/hr, Last Rate: 100 mL/hr (08/14/24 1339)      PRN Meds:.  hydrALAZINE    morphine injection    morphine injection    ondansetron    phenol    Insert peripheral IV **AND** sodium chloride (PF)        Objective:     Blood pressure 168/67, pulse 84, temperature 98 °F (36.7 °C), resp. rate 20, height 5' 5\" (1.651 m), weight 73.2 kg (161 lb 6 oz), SpO2 95%.,Body mass index is 26.85 kg/m².      Intake/Output Summary (Last 24 hours) at 8/15/2024 0822  Last data filed at 8/14/2024 1700  Gross per 24 hour   Intake 0 ml   Output 400 ml   Net -400 ml       Invasive Devices       Peripheral Intravenous Line  Duration             Peripheral IV 08/13/24 Right Antecubital 1 day              Drain  Duration             NG/OG/Enteral Tube Nasogastric 14 Fr Left nare 1 day                    Physical Exam:     General no acute distress.  [place] Islander-speaking,  was used for interpretation.  ENT clear.  Oral mucosa moist.  Sclera white.  Neck trachea midline.  Chest symmetric rise.  Good inspiratory effort.  Heart RRR.  Lungs clear throughout.  No adventitious sounds.  Abdomen mildly distended.  Positive bowel sounds.  Laparoscopic scars from previous cholecystectomy.  The abdomen is soft, not particularly " tympanic.  Overall tenderness is improved without any peritoneal signs.  No masses are felt.  Extremities ambulation not observed.  No calf tenderness or peripheral edema.  Neurological no focal motor or sensory neurologic weakness present on cursory exam.  Mental status at baseline.    Lab, Imaging and other studies:I have personally reviewed pertinent lab results.  , CBC:   Lab Results   Component Value Date    WBC 5.87 08/15/2024    HGB 14.0 08/15/2024    HCT 41.7 08/15/2024    MCV 88 08/15/2024     08/15/2024    RBC 4.74 08/15/2024    MCH 29.5 08/15/2024    MCHC 33.6 08/15/2024    RDW 11.9 08/15/2024    MPV 9.3 08/15/2024    NRBC 0 08/15/2024   , CMP:   Lab Results   Component Value Date    SODIUM 136 08/15/2024    K 3.8 08/15/2024     08/15/2024    CO2 24 08/15/2024    BUN 14 08/15/2024    CREATININE 0.65 08/15/2024    CALCIUM 7.9 (L) 08/15/2024    EGFR 87 08/15/2024     VTE Pharmacologic Prophylaxis: Heparin  VTE Mechanical Prophylaxis: sequential compression device    Celestino James PA-C heparin

## 2024-08-15 NOTE — OCCUPATIONAL THERAPY NOTE
"    Occupational Therapy Evaluation     Patient Name: Xena Holly  Today's Date: 8/15/2024  Problem List  Principal Problem:    SBO (small bowel obstruction) (HCC)  Active Problems:    Essential hypertension    Type 2 diabetes mellitus without complication, without long-term current use of insulin (HCC)    Gastroesophageal reflux disease without esophagitis    Nausea and vomiting    Epigastric pain    Past Medical History  Past Medical History:   Diagnosis Date    Diabetes mellitus (HCC)     HLD (hyperlipidemia)     Hypertension      Past Surgical History  Past Surgical History:   Procedure Laterality Date    CHOLECYSTECTOMY               08/15/24 0957   OT Last Visit   OT Visit Date 08/15/24   Note Type   Note type Evaluation   Pain Assessment   Pain Score No Pain   Restrictions/Precautions   Weight Bearing Precautions Per Order No   Other Precautions Multiple lines;Fall Risk   Home Living   Type of Home House   Home Layout Two level;Bed/bath upstairs;Other (Comment)  (no CHI; FOS to 2nd)   Bathroom Accessibility Accessible   Home Equipment Other (Comment)  (no DME)   Additional Comments pt on RA during session; Spo2 WFL with no complaints of SOB   Prior Function   Level of [place] Independent with ADLs;Independent with functional mobility;Independent with IADLS   Lives With Daughter   IADLs Family/Friend/Other provides transportation   Falls in the last 6 months 0   Subjective   Subjective \"okay\"   ADL   Where Assessed Edge of bed   LB Dressing Assistance 7  Independent   Additional Comments pt dons socks at EOB with no difficulties   Bed Mobility   Supine to Sit 6  Modified independent   Additional items Bedrails   Sit to Supine   (seated in chair)   Additional Comments pt on RA during session; SpO2 WFL with no complaints of SOB   Transfers   Sit to Stand 7  Independent   Stand to Sit 7  Independent   Additional Comments pt performs functional transfers with no device; no significant LOB or instability "   Functional Mobility   Functional Mobility 7  Independent   Additional Comments pt performs functional mobility ~200ft with no device; pt pushes IV pole throughout with no difficulties   Additional items   (no device)   Balance   Static Sitting Good   Dynamic Sitting Good   Static Standing Good   Dynamic Standing Good   Ambulatory Good   RUE Assessment   RUE Assessment WFL   LUE Assessment   LUE Assessment WFL   Hand Function   Gross Motor Coordination Functional   Fine Motor Coordination Functional   Sensation   Light Touch No apparent deficits   Sharp/Dull No apparent deficits   Psychosocial   Psychosocial (WDL) WDL   Cognition   Overall Cognitive Status WFL   Arousal/Participation Alert   Attention Within functional limits   Orientation Level Oriented X4   Memory Within functional limits   Following Commands Follows all commands and directions without difficulty   Comments pt utilized  during session with success due to mainly [place] speaking   Assessment   Assessment Patient is a 74 y.o. female admitted to [facility] on 8/13/2024 due to SBO (small bowel obstruction) (HCC). Pt performed at (I) level with no OT needs identified at this time Comorbidities affecting pt's physical performance at time of assessment include  HTN, SBO, DM, GERD, nausea and vomiting, epigastric pain, HLD .  The patient's raw score on the AM-PAC Daily Activity Inpatient Short Form is 24. A raw score of greater than or equal to 19 suggests the patient may benefit from discharge to home. Please refer to the recommendation of the Occupational Therapist for safe discharge planning.  From OT standpoint recommend anticipate no needs upon D/C. No further acute OT needs identified at this time. Recommend continued mobilization with hospital staff and restorative services while in the hospital to increase pt’s endurance and strength upon D/C. From OT standpoint, recommend D/C to home with family support when medically cleared.  D/C pt from OT caseload at this time. Pt benefited from co-evaluation of skilled OT and PT therapists in order to most appropriately address functional deficits d/t extensive assistance required for safe functional mobility, decreased activity tolerance, and regression from functioning level prior to admission and/or onset of present illness. OT/PT objectives were addressed separately; please see PT note for specific goal areas targeted.   Goals   Patient Goals to go home   Discharge Recommendation   Rehab Resource Intensity Level, OT No post-acute rehabilitation needs   AM-PAC Daily Activity Inpatient   Lower Body Dressing 4   Bathing 4   Toileting 4   Upper Body Dressing 4   Grooming 4   Eating 4   Daily Activity Raw Score 24   Daily Activity Standardized Score (Calc for Raw Score >=11) 57.54   AM-PAC Applied Cognition Inpatient   Following a Speech/Presentation 4   Understanding Ordinary Conversation 4   Taking Medications 4   Remembering Where Things Are Placed or Put Away 4   Remembering List of 4-5 Errands 4   Taking Care of Complicated Tasks 4   Applied Cognition Raw Score 24   Applied Cognition Standardized Score 62.21

## 2024-08-15 NOTE — CASE MANAGEMENT
Case Management Discharge Planning Note    Patient name Xena Holly  Location /403-01 MRN [id number]  : 1950 Date 8/15/2024       Current Admission Date: 2024  Current Admission Diagnosis:SBO (small bowel obstruction) (HCC)   Patient Active Problem List    Diagnosis Date Noted Date Diagnosed    SBO (small bowel obstruction) (HCC) 2024     Type 2 diabetes mellitus without complication, without long-term current use of insulin (HCC) 2024     Gastroesophageal reflux disease without esophagitis 2024     Nausea and vomiting 2024     Epigastric pain 2024     Essential hypertension 2022     History of diabetes mellitus 2022     Hyperlipidemia 2022     Simple endometrial hyperplasia 2022       LOS (days): 1  Geometric Mean LOS (GMLOS) (days): 3.1  Days to GMLOS:1.8     OBJECTIVE:  Risk of Unplanned Readmission Score: 11.67         Current admission status: Inpatient   Preferred Pharmacy:   [facility]/pharmacy #[id number] - [place] - [address]  [address] [address]  Phone: [phone] Fax: [phone]    Primary Care Provider: No primary care provider on file.    Primary Insurance: [facility]  Secondary Insurance: [facility]    DISCHARGE DETAILS:  Chart review done. Plans at this time are home on dc with OP follow up; Pt with no PCP. -Information provided on [facility] for [facility] for pt to get established. CM will continue to follow and assist in dc planning.

## 2024-08-15 NOTE — PHYSICAL THERAPY NOTE
Physical Therapy Evaluation    Patient Name: Xena Holly    Today's Date: 8/15/2024     Problem List  Principal Problem:    SBO (small bowel obstruction) (HCC)  Active Problems:    Essential hypertension    Type 2 diabetes mellitus without complication, without long-term current use of insulin (HCC)    Gastroesophageal reflux disease without esophagitis    Nausea and vomiting    Epigastric pain       Past Medical History  Past Medical History:   Diagnosis Date    Diabetes mellitus (HCC)     HLD (hyperlipidemia)     Hypertension         Past Surgical History  Past Surgical History:   Procedure Laterality Date    CHOLECYSTECTOMY             08/15/24 0958   PT Last Visit   PT Visit Date 08/15/24   Note Type   Note type Evaluation   Pain Assessment   Pain Assessment Tool 0-10   Pain Score No Pain   Restrictions/Precautions   Weight Bearing Precautions Per Order No   Other Precautions Multiple lines   Home Living   Type of Home House   Home Layout Two level;Bed/bath upstairs  (0 CHI)   Bathroom Accessibility Accessible   Additional Comments pt denies use of DME at baseline   Prior Function   Level of [place] Independent with ADLs;Independent with functional mobility;Independent with IADLS   Lives With Daughter   Receives Help From Family   IADLs Family/Friend/Other provides transportation   Falls in the last 6 months 0   Vocational Retired   General   Family/Caregiver Present No   Cognition   Overall Cognitive Status WFL   Arousal/Participation Alert   Attention Within functional limits   Orientation Level Oriented X4   Following Commands Follows all commands and directions without difficulty   Comments  used throughout session via iPad   Subjective   Subjective pt pleasant and cooperative   RLE Assessment   RLE Assessment WFL   LLE Assessment   LLE Assessment WFL   Bed Mobility   Supine to Sit 7  Independent   Sit to Supine   (pt OOB at end of session)    Transfers   Sit to Stand 7  Independent   Stand to Sit 7  Independent   Additional Comments no device used   Ambulation/Elevation   Gait pattern WNL   Gait Assistance 7  Independent   Assistive Device None   Distance 200'   Balance   Static Sitting Good   Dynamic Sitting Good   Static Standing Good   Dynamic Standing Good   Ambulatory Good   Endurance Deficit   Endurance Deficit No   Activity Tolerance   Activity Tolerance Patient tolerated treatment well   Assessment   Prognosis Good   Assessment Patient is a 74 y.o. female evaluated by Physical Therapy s/p admit to [facility] on 8/13/2024 with admitting diagnosis of: SBO (small bowel obstruction), Abdominal pain, Nausea and vomiting, Pulmonary nodule, and principal problem of: SBO (small bowel obstruction). PT was consulted to assess patient's functional mobility and discharge needs. Ordered are PT Evaluation and treatment with activity level of: up and OOB as tolerated. Comorbidities affecting patient's physical performance at time of assessment include:  HTN, DM, GERD, HLD . Personal factors affecting the patient at time of IE include: lives in 2 story home. Please locate objective findings from PT assessment regarding body systems outlined above. Upon evaluation, pt able to perform all functional mobility independently without assistive device. Pt demonstrating WFL strength, balance, and endurance; able to ambulate 200' without difficulty. Continued PT intervention not indicated at this time; will d/c PT orders. The patient's AM-PAC Basic Mobility Inpatient Short Form Raw Score is 24. A Raw score of greater than 16 suggests the patient may benefit from discharge to home. Please also refer to the recommendation of the Physical Therapist for safe discharge planning. Co treatment with OT secondary to complex medical condition of pt, possible A of 2 required to achieve and maintain transitional movements, requiring the need of skilled therapeutic  intervention of 2 therapists to achieve delivery of services.   Goals   Patient Goals to go home   Plan   PT Frequency Other (Comment)  (eval only)   Discharge Recommendation   Rehab Resource Intensity Level, PT No post-acute rehabilitation needs   AM-PAC Basic Mobility Inpatient   Turning in Flat Bed Without Bedrails 4   Lying on Back to Sitting on Edge of Flat Bed Without Bedrails 4   Moving Bed to Chair 4   Standing Up From Chair Using Arms 4   Walk in Room 4   Climb 3-5 Stairs With Railing 4   Basic Mobility Inpatient Raw Score 24   Basic Mobility Standardized Score 57.68   [facility] Highest Level Of Mobility   -[facility] Goal 8: Walk 250 feet or more   -HLM Achieved 8: Walk 250 feet ot more   End of Consult   Patient Position at End of Consult Bedside chair;All needs within reach

## 2024-08-15 NOTE — PLAN OF CARE
Problem: PAIN - ADULT  Goal: Verbalizes/displays adequate comfort level or baseline comfort level  Description: Interventions:  - Encourage patient to monitor pain and request assistance  - Assess pain using appropriate pain scale  - Administer analgesics based on type and severity of pain and evaluate response  - Implement non-pharmacological measures as appropriate and evaluate response  - Consider cultural and social influences on pain and pain management  - Notify physician/advanced practitioner if interventions unsuccessful or patient reports new pain  Outcome: Progressing     Problem: INFECTION - ADULT  Goal: Absence or prevention of progression during hospitalization  Description: INTERVENTIONS:  - Assess and monitor for signs and symptoms of infection  - Monitor lab/diagnostic results  - Monitor all insertion sites, i.e. indwelling lines, tubes, and drains  - Monitor endotracheal if appropriate and nasal secretions for changes in amount and color  - Industry appropriate cooling/warming therapies per order  - Administer medications as ordered  - Instruct and encourage patient and family to use good hand hygiene technique  - Identify and instruct in appropriate isolation precautions for identified infection/condition  Outcome: Progressing     Problem: SAFETY ADULT  Goal: Patient will remain free of falls  Description: INTERVENTIONS:  - Educate patient/family on patient safety including physical limitations  - Instruct patient to call for assistance with activity   - Consult OT/PT to assist with strengthening/mobility   - Keep Call bell within reach  - Keep bed low and locked with side rails adjusted as appropriate  - Keep care items and personal belongings within reach  - Initiate and maintain comfort rounds  - Make Fall Risk Sign visible to staff  - Offer Toileting every 2 Hours, in advance of need  - Initiate/Maintain bed/chair alarm  - Obtain necessary fall risk management equipment: nonskid socks  -  Apply yellow socks and bracelet for high fall risk patients  - Consider moving patient to room near nurses station  Outcome: Progressing  Goal: Maintain or return to baseline ADL function  Description: INTERVENTIONS:  -  Assess patient's ability to carry out ADLs; assess patient's baseline for ADL function and identify physical deficits which impact ability to perform ADLs (bathing, care of mouth/teeth, toileting, grooming, dressing, etc.)  - Assess/evaluate cause of self-care deficits   - Assess range of motion  - Assess patient's mobility; develop plan if impaired  - Assess patient's need for assistive devices and provide as appropriate  - Encourage maximum independence but intervene and supervise when necessary  - Involve family in performance of ADLs  - Assess for home care needs following discharge   - Consider OT consult to assist with ADL evaluation and planning for discharge  - Provide patient education as appropriate  Outcome: Progressing  Goal: Maintains/Returns to pre admission functional level  Description: INTERVENTIONS:  - Perform AM-PAC 6 Click Basic Mobility/ Daily Activity assessment daily.  - Set and communicate daily mobility goal to care team and patient/family/caregiver.   - Collaborate with rehabilitation services on mobility goals if consulted  - Perform Range of Motion 3 times a day.  - Reposition patient every 3 hours.  - Dangle patient 3 times a day  - Stand patient 3 times a day  - Ambulate patient 3 times a day  - Out of bed to chair 3 times a day   - Out of bed for meals 3 times a day  - Out of bed for toileting  - Record patient progress and toleration of activity level   Outcome: Progressing     Problem: DISCHARGE PLANNING  Goal: Discharge to home or other facility with appropriate resources  Description: INTERVENTIONS:  - Identify barriers to discharge w/patient and caregiver  - Arrange for needed discharge resources and transportation as appropriate  - Identify discharge learning  needs (meds, wound care, etc.)  - Arrange for interpretive services to assist at discharge as needed  - Refer to Case Management Department for coordinating discharge planning if the patient needs post-hospital services based on physician/advanced practitioner order or complex needs related to functional status, cognitive ability, or social support system  Outcome: Progressing     Problem: Knowledge Deficit  Goal: Patient/family/caregiver demonstrates understanding of disease process, treatment plan, medications, and discharge instructions  Description: Complete learning assessment and assess knowledge base.  Interventions:  - Provide teaching at level of understanding  - Provide teaching via preferred learning methods  Outcome: Progressing     Problem: GASTROINTESTINAL - ADULT  Goal: Minimal or absence of nausea and/or vomiting  Description: INTERVENTIONS:  - Administer IV fluids if ordered to ensure adequate hydration  - Maintain NPO status until nausea and vomiting are resolved  - Nasogastric tube if ordered  - Administer ordered antiemetic medications as needed  - Provide nonpharmacologic comfort measures as appropriate  - Advance diet as tolerated, if ordered  - Consider nutrition services referral to assist patient with adequate nutrition and appropriate food choices  Outcome: Progressing  Goal: Maintains or returns to baseline bowel function  Description: INTERVENTIONS:  - Assess bowel function  - Encourage oral fluids to ensure adequate hydration  - Administer IV fluids if ordered to ensure adequate hydration  - Administer ordered medications as needed  - Encourage mobilization and activity  - Consider nutritional services referral to assist patient with adequate nutrition and appropriate food choices  Outcome: Progressing  Goal: Maintains adequate nutritional intake  Description: INTERVENTIONS:  - Monitor percentage of each meal consumed  - Identify factors contributing to decreased intake, treat as  appropriate  - Assist with meals as needed  - Monitor I&O, weight, and lab values if indicated  - Obtain nutrition services referral as needed  Outcome: Progressing  Goal: Oral mucous membranes remain intact  Description: INTERVENTIONS  - Assess oral mucosa and hygiene practices  - Implement preventative oral hygiene regimen  - Implement oral medicated treatments as ordered  - Initiate Nutrition services referral as needed  Outcome: Progressing

## 2024-08-16 VITALS
TEMPERATURE: 98.1 F | RESPIRATION RATE: 20 BRPM | DIASTOLIC BLOOD PRESSURE: 87 MMHG | HEIGHT: 65 IN | SYSTOLIC BLOOD PRESSURE: 174 MMHG | WEIGHT: 161.38 LBS | OXYGEN SATURATION: 98 % | HEART RATE: 96 BPM | BODY MASS INDEX: 26.89 KG/M2

## 2024-08-16 LAB
ANION GAP SERPL CALCULATED.3IONS-SCNC: 11 MMOL/L (ref 4–13)
BASOPHILS # BLD AUTO: 0.01 THOUSANDS/ÂΜL (ref 0–0.1)
BASOPHILS NFR BLD AUTO: 0 % (ref 0–1)
BUN SERPL-MCNC: 9 MG/DL (ref 5–25)
CALCIUM SERPL-MCNC: 7.6 MG/DL (ref 8.4–10.2)
CHLORIDE SERPL-SCNC: 103 MMOL/L (ref 96–108)
CO2 SERPL-SCNC: 23 MMOL/L (ref 21–32)
CREAT SERPL-MCNC: 0.56 MG/DL (ref 0.6–1.3)
EOSINOPHIL # BLD AUTO: 0.07 THOUSAND/ÂΜL (ref 0–0.61)
EOSINOPHIL NFR BLD AUTO: 2 % (ref 0–6)
ERYTHROCYTE [DISTWIDTH] IN BLOOD BY AUTOMATED COUNT: 11.9 % (ref 11.6–15.1)
GFR SERPL CREATININE-BSD FRML MDRD: 92 ML/MIN/1.73SQ M
GLUCOSE SERPL-MCNC: 102 MG/DL (ref 65–140)
GLUCOSE SERPL-MCNC: 214 MG/DL (ref 65–140)
GLUCOSE SERPL-MCNC: 91 MG/DL (ref 65–140)
GLUCOSE SERPL-MCNC: 94 MG/DL (ref 65–140)
HCT VFR BLD AUTO: 38.8 % (ref 34.8–46.1)
HGB BLD-MCNC: 13.7 G/DL (ref 11.5–15.4)
IMM GRANULOCYTES # BLD AUTO: 0.02 THOUSAND/UL (ref 0–0.2)
IMM GRANULOCYTES NFR BLD AUTO: 0 % (ref 0–2)
LYMPHOCYTES # BLD AUTO: 1.85 THOUSANDS/ÂΜL (ref 0.6–4.47)
LYMPHOCYTES NFR BLD AUTO: 39 % (ref 14–44)
MAGNESIUM SERPL-MCNC: 2.2 MG/DL (ref 1.9–2.7)
MCH RBC QN AUTO: 30.2 PG (ref 26.8–34.3)
MCHC RBC AUTO-ENTMCNC: 35.3 G/DL (ref 31.4–37.4)
MCV RBC AUTO: 86 FL (ref 82–98)
MONOCYTES # BLD AUTO: 0.44 THOUSAND/ÂΜL (ref 0.17–1.22)
MONOCYTES NFR BLD AUTO: 9 % (ref 4–12)
NEUTROPHILS # BLD AUTO: 2.37 THOUSANDS/ÂΜL (ref 1.85–7.62)
NEUTS SEG NFR BLD AUTO: 50 % (ref 43–75)
NRBC BLD AUTO-RTO: 0 /100 WBCS
PLATELET # BLD AUTO: 193 THOUSANDS/UL (ref 149–390)
PMV BLD AUTO: 9.4 FL (ref 8.9–12.7)
POTASSIUM SERPL-SCNC: 3.5 MMOL/L (ref 3.5–5.3)
RBC # BLD AUTO: 4.54 MILLION/UL (ref 3.81–5.12)
SODIUM SERPL-SCNC: 137 MMOL/L (ref 135–147)
WBC # BLD AUTO: 4.76 THOUSAND/UL (ref 4.31–10.16)

## 2024-08-16 PROCEDURE — 99232 SBSQ HOSP IP/OBS MODERATE 35: CPT

## 2024-08-16 PROCEDURE — NC001 PR NO CHARGE

## 2024-08-16 PROCEDURE — 80048 BASIC METABOLIC PNL TOTAL CA: CPT

## 2024-08-16 PROCEDURE — NC001 PR NO CHARGE: Performed by: PHYSICIAN ASSISTANT

## 2024-08-16 PROCEDURE — 83735 ASSAY OF MAGNESIUM: CPT

## 2024-08-16 PROCEDURE — 99238 HOSP IP/OBS DSCHRG MGMT 30/<: CPT | Performed by: PHYSICIAN ASSISTANT

## 2024-08-16 PROCEDURE — 85025 COMPLETE CBC W/AUTO DIFF WBC: CPT

## 2024-08-16 PROCEDURE — 82948 REAGENT STRIP/BLOOD GLUCOSE: CPT

## 2024-08-16 RX ORDER — METOPROLOL SUCCINATE 50 MG/1
50 TABLET, EXTENDED RELEASE ORAL DAILY
Status: DISCONTINUED | OUTPATIENT
Start: 2024-08-16 | End: 2024-08-16 | Stop reason: HOSPADM

## 2024-08-16 RX ORDER — INSULIN LISPRO 100 [IU]/ML
1-6 INJECTION, SOLUTION INTRAVENOUS; SUBCUTANEOUS EVERY 6 HOURS SCHEDULED
Status: DISCONTINUED | OUTPATIENT
Start: 2024-08-16 | End: 2024-08-16 | Stop reason: HOSPADM

## 2024-08-16 RX ADMIN — METOPROLOL SUCCINATE 50 MG: 50 TABLET, EXTENDED RELEASE ORAL at 11:53

## 2024-08-16 RX ADMIN — HEPARIN SODIUM 5000 UNITS: 5000 INJECTION INTRAVENOUS; SUBCUTANEOUS at 14:13

## 2024-08-16 RX ADMIN — FAMOTIDINE 20 MG: 10 INJECTION, SOLUTION INTRAVENOUS at 09:31

## 2024-08-16 RX ADMIN — HEPARIN SODIUM 5000 UNITS: 5000 INJECTION INTRAVENOUS; SUBCUTANEOUS at 05:37

## 2024-08-16 RX ADMIN — INSULIN LISPRO 2 UNITS: 100 INJECTION, SOLUTION INTRAVENOUS; SUBCUTANEOUS at 11:49

## 2024-08-16 RX ADMIN — HYDRALAZINE HYDROCHLORIDE 5 MG: 20 INJECTION INTRAMUSCULAR; INTRAVENOUS at 14:13

## 2024-08-16 NOTE — DISCHARGE SUMMARY
[facility]  Discharge- Xena Holly 1950, 74 y.o. female MRN: [id number]  Unit/Bed#: 403-01 Encounter: [id number]  Primary Care Provider: No primary care provider on file.   Date and time admitted to hospital: 8/13/2024  7:48 PM    * SBO (small bowel obstruction) (HCC)  Assessment & Plan  Presented with abdominal pain nausea with imaging findings as follow   CT Abd: Findings consistent with small bowel obstruction with transition point in the right mid abdomen involving a mild to distal jejunal loop with visualization of the distended small bowel loops prior to this regionGeneral surgery consulted and following   Appreciate surgical input  NGT removed  Gastrografin shows passed contrast  Advanced to surgical lite today - tolerated without issue, she wishes to go home. Surgery cleared her for discharge        Gastroesophageal reflux disease without esophagitis  Assessment & Plan  Resume omeprazole on discharge    Type 2 diabetes mellitus without complication, without long-term current use of insulin ([facility])  Assessment & Plan  Known history   Hold home medication  - resume on dischagre  Continue SSI and blood glucose monitoring with meals    Essential hypertension  Assessment & Plan  Resume metoprolol  Resume hyzaar on discharge        Medical Problems       Resolved Problems  Date Reviewed: 8/14/2024   None       Discharging Physician / Practitioner: Angela Boyle PA-C  PCP: No primary care provider on file.  Admission Date:   Admission Orders (From admission, onward)       Ordered        08/14/24 0101  INPATIENT ADMISSION  Once                          Discharge Date: 08/16/24    Consultations During Hospital Stay:  surgery    Procedures Performed:   NGT     Significant Findings / Test Results:     XR abdomen complete inc upright and/or decubitus   Final Result      Feeding tube tip terminates stomach. Contrast is seen throughout the colon.         Workstation performed: [id number]          XR chest portable   Final Result      Tip of endogastric tube is in the right upper abdomen            Workstation performed: [id number]         CT abdomen pelvis wo contrast   Final Result      Findings consistent with small bowel obstruction with transition point in the right mid abdomen involving a mid to distal jejunal loop with fecalization of the distended small bowel loop prior to this region. No free air, pneumatosis intestinalis, free    fluid, or loculated fluid collections in the abdomen/pelvis. Additional ancillary findings detailed above.      Workstation performed: [id number]         X-ray chest 1 view portable   ED Interpretation   Per my independent interpretation. Radiologist to provide formal read. Elevation of rt hemidiaphragm no infiltrate no effusion      Final Result   Mild scarring right lung base with lungs otherwise clear. No consolidation.      Workstation performed: [id number]               Incidental Findings:   none    Test Results Pending at Discharge (will require follow up):   none     Outpatient Tests Requested:  none    Complications:  none    Reason for Admission: SBO    Hospital Course:   Xena Holly is a 74 y.o. female patient who originally presented to the hospital on 8/13/2024 due to abdominal pain, nausea and vomiting.  She reports some chills but no fevers.  She was eating and drinking normally and then had a bout of vomitus episode.  Reports headache.  Notices her blood pressure elevated at home.  She is Georgian-speaking only and her son is here at bedside who is the historian.  At this time they do not know the medication that she takes at home and will bring them in tomorrow.  CT scan confirms small bowel obstruction.     Please see above list of diagnoses and related plan for additional information.     Condition at Discharge: good    Discharge Day Visit / Exam:   * Please refer to separate progress note for these details *    Discussion with Family: Patient declined  call to .     Discharge instructions/Information to patient and family:   See after visit summary for information provided to patient and family.      Provisions for Follow-Up Care:  See after visit summary for information related to follow-up care and any pertinent home health orders.      Mobility at time of Discharge:   Basic Mobility Inpatient Raw Score: 24  JH-HLM Goal: 8: Walk 250 feet or more  JH-HLM Achieved: 8: Walk 250 feet ot more  HLM Goal achieved. Continue to encourage appropriate mobility.     Disposition:   Home    Planned Readmission: none     Discharge Statement:  I spent 23 minutes discharging the patient. This time was spent on the day of discharge. I had direct contact with the patient on the day of discharge. Greater than 50% of the total time was spent examining patient, answering all patient questions, arranging and discussing plan of care with patient as well as directly providing post-discharge instructions.  Additional time then spent on discharge activities.    Discharge Medications:  See after visit summary for reconciled discharge medications provided to patient and/or family.      **Please Note: This note may have been constructed using a voice recognition system**

## 2024-08-16 NOTE — ASSESSMENT & PLAN NOTE
Known history   Hold home medication  - resume on dischagre  Continue SSI and blood glucose monitoring with meals

## 2024-08-16 NOTE — PROGRESS NOTES
Progress Note - General Surgery   Xena Holly 74 y.o. female MRN: [id number]  Unit/Bed#: 403-01 Encounter: [id number]    Assessment:    75-year-old [place]er-speaking female admitted now 3 days ago with small bowel obstruction.  Initial NG tube placement with bilious drainage, NG tube clamped yesterday.  Gastrografin showed contrast made its way to the colon.  NG tube was removed last night and diet was advanced to clears which she is tolerating.  She had 2 bowel movements yesterday and passing gas.  Denies abdominal pain.    This is her first episode of a small bowel obstruction.  Likely secondary to intra-abdominal adhesions from prior abdominal surgeries including laparoscopic cholecystectomy.  No history of any inflammatory or infectious bowel disease.    CBC shows normal white count 4.76  Hemoglobin 13.7  BMP with normal electrolytes and kidney function  Magnesium 2.2  Fingerstick glucose 102    VSS, stable.    Plan:  Patient doing well.  Will advance to surgical soft light meal today.  Discontinue IV fluids  Out of bed to chair  Ambulate in hallway  Probable discharge home later today.  Remainder medical management per the medicine primary service.    Subjective/Objective     Chief Complaint: Denies abdominal pain.  Having bowel function.    Subjective: Patient speaks some broken English.   was used.  Denies any complaints.  Overall feels much better.  No abdominal pain.  Passing flatus and had 2 bowel movements since yesterday.  Tolerated clear liquids after NG tube was removed.    I/O         08/14 0701  08/15 0700 08/15 0701  08/16 0700 08/16 0701  08/17 0700    P.O. 0 960     I.V. (mL/kg)  1000 (13.7)     IV Piggyback       Total Intake(mL/kg) 0 (0) 1960 (26.8)     Urine (mL/kg/hr) 400 (0.2) 400 (0.2)     Emesis/NG output  500     Stool  0     Total Output 400 900     Net -400 +1060            Unmeasured Urine Occurrence  2 x     Unmeasured Stool Occurrence  1 x         '  Objective:     Blood  "pressure 166/76, pulse 93, temperature 98 °F (36.7 °C), resp. rate 18, height 5' 5\" (1.651 m), weight 73.2 kg (161 lb 6 oz), SpO2 98%.,Body mass index is 26.85 kg/m².      Intake/Output Summary (Last 24 hours) at 8/16/2024 0714  Last data filed at 8/15/2024 2149  Gross per 24 hour   Intake 1960 ml   Output 900 ml   Net 1060 ml       Invasive Devices       Peripheral Intravenous Line  Duration             Peripheral IV 08/13/24 Right Antecubital 2 days    Peripheral IV 08/15/24 Right;Ventral (anterior) Forearm <1 day                    Physical Exam:     Awake, no acute distress.  English language converted to Ukrainian with phone .  Patient does comprehend some English.  ENT clear.  Oral mucosa moist.  Skin without rash or jaundice.  Neck supple.  Trachea midline.  Heart RRR.  Lungs clear throughout.  Abdomen positive bowel sounds.  Laparoscopic cholecystectomy scars.  Abdomen is nondistended.  No tympany.  Soft throughout without guarding or rebound.  No masses.  No abdominal hernias.  Extremities no calf tenderness, no peripheral edema.  Neurological CN's grossly intact.  Mental status appropriate.  No tremor noted.    Lab, Imaging and other studies:I have personally reviewed pertinent lab results.  , CBC:   Lab Results   Component Value Date    WBC 4.76 08/16/2024    HGB 13.7 08/16/2024    HCT 38.8 08/16/2024    MCV 86 08/16/2024     08/16/2024    RBC 4.54 08/16/2024    MCH 30.2 08/16/2024    MCHC 35.3 08/16/2024    RDW 11.9 08/16/2024    MPV 9.4 08/16/2024    NRBC 0 08/16/2024   , CMP:   Lab Results   Component Value Date    SODIUM 137 08/16/2024    K 3.5 08/16/2024     08/16/2024    CO2 23 08/16/2024    BUN 9 08/16/2024    CREATININE 0.56 (L) 08/16/2024    CALCIUM 7.6 (L) 08/16/2024    EGFR 92 08/16/2024     VTE Pharmacologic Prophylaxis: Enoxaparin (Lovenox)  VTE Mechanical Prophylaxis: sequential compression device    Celestino James PA-C    "

## 2024-08-16 NOTE — ASSESSMENT & PLAN NOTE
Presented with abdominal pain nausea with imaging findings as follow   CT Abd: Findings consistent with small bowel obstruction with transition point in the right mid abdomen involving a mild to distal jejunal loop with visualization of the distended small bowel loops prior to this regionGeneral surgery consulted and following   Appreciate surgical input  NGT removed  Gastrografin shows passed contrast  Advanced to surgical lite today - tolerated without issue, she wishes to go home. Surgery cleared her for discharge

## 2024-08-16 NOTE — CASE MANAGEMENT
Case Management Discharge Planning Note    Patient name Xena Holly  Location /403-01 MRN [id number]  : 1950 Date 2024       Current Admission Date: 2024  Current Admission Diagnosis:SBO (small bowel obstruction) ([facility])   Patient Active Problem List    Diagnosis Date Noted Date Diagnosed    SBO (small bowel obstruction) (HCC) 2024     Type 2 diabetes mellitus without complication, without long-term current use of insulin (HCC) 2024     Gastroesophageal reflux disease without esophagitis 2024     Nausea and vomiting 2024     Epigastric pain 2024     Essential hypertension 2022     History of diabetes mellitus 2022     Hyperlipidemia 2022     Simple endometrial hyperplasia 2022       LOS (days): 2  Geometric Mean LOS (GMLOS) (days): 3.1  Days to GMLOS:0.5     OBJECTIVE:  Risk of Unplanned Readmission Score: 11.89         Current admission status: Inpatient   Preferred Pharmacy:   [id number] - [place] - [address]  [address] [place] [address]  Phone: [phone] Fax: [phone]    Primary Care Provider: No primary care provider on file.    Primary Insurance: [employer]  REP  [facility]    DISCHARGE DETAILS:    Pt is being dc'd home on this date. Daughter transporting pt home.                                                                                    IMM Given (Date):: 24  IMM Given to:: Family

## 2024-08-16 NOTE — PROGRESS NOTES
[facility]  Progress Note  Name: Xena Holly I  MRN: [id number]  Unit/Bed#: 403-01 I Date of Admission: 8/13/2024   Date of Service: 8/16/2024 I Hospital Day: 2    Assessment & Plan   * SBO (small bowel obstruction) (HCC)  Assessment & Plan  Presented with abdominal pain nausea with imaging findings as follow   CT Abd: Findings consistent with small bowel obstruction with transition point in the right mid abdomen involving a mild to distal jejunal loop with visualization of the distended small bowel loops prior to this regionGeneral surgery consulted and following   Appreciate surgical input  NGT removed  Gastrografin shows passed contrast  Advanced to surgical lite today, If tolerates can likely be discharged later        Gastroesophageal reflux disease without esophagitis  Assessment & Plan  Continue pepcid 20mg iv bid    Type 2 diabetes mellitus without complication, without long-term current use of insulin (HCC)  Assessment & Plan  Known history   Hold home medication   Continue SSI and blood glucose monitoring with meals    Essential hypertension  Assessment & Plan  Resume metoprolol  Continue to hole hyzaar  Continue Hydralazine prn.  For SBP >160               VTE Pharmacologic Prophylaxis: VTE Score: 4 Moderate Risk (Score 3-4) - Pharmacological DVT Prophylaxis Ordered: heparin.    Mobility:   Basic Mobility Inpatient Raw Score: 24  JH-HLM Goal: 8: Walk 250 feet or more  JH-HLM Achieved: 8: Walk 250 feet ot more  JH-HLM Goal achieved. Continue to encourage appropriate mobility.    Patient Centered Rounds: I performed bedside rounds with nursing staff today.   Discussions with Specialists or Other Care Team Provider: CM, surgery    Education and Discussions with Family / Patient: Patient declined call to .     Total Time Spent on Date of Encounter in care of patient: 40 mins. This time was spent on one or more of the following: performing physical exam; counseling and  coordination of care; obtaining or reviewing history; documenting in the medical record; reviewing/ordering tests, medications or procedures; communicating with other healthcare professionals and discussing with patient's family/caregivers.    Current Length of Stay: 2 day(s)  Current Patient Status: Inpatient   Certification Statement: The patient will continue to require additional inpatient hospital stay due to advancing diet  Discharge Plan: Anticipate discharge later today or tomorrow to home.    Code Status: Level 1 - Full Code    Subjective:   Patient reports starting last night she began having diarrhea and is still having episodes.     Objective:     Vitals:   Temp (24hrs), Av.2 °F (36.8 °C), Min:98 °F (36.7 °C), Max:98.4 °F (36.9 °C)    Temp:  [98 °F (36.7 °C)-98.4 °F (36.9 °C)] 98 °F (36.7 °C)  HR:  [89-99] 93  Resp:  [18-20] 18  BP: (160-166)/() 166/76  SpO2:  [98 %-99 %] 98 %  Body mass index is 26.85 kg/m².     Input and Output Summary (last 24 hours):     Intake/Output Summary (Last 24 hours) at 2024 1033  Last data filed at 2024 0833  Gross per 24 hour   Intake 2560 ml   Output 400 ml   Net 2160 ml       Physical Exam:   Physical Exam  Vitals and nursing note reviewed.   Constitutional:       General: She is not in acute distress.     Appearance: She is obese.   Cardiovascular:      Rate and Rhythm: Normal rate and regular rhythm.      Pulses: Normal pulses.      Heart sounds: Normal heart sounds.   Pulmonary:      Effort: Pulmonary effort is normal.      Breath sounds: Normal breath sounds.   Abdominal:      General: Bowel sounds are normal.      Palpations: Abdomen is soft.      Tenderness: There is no abdominal tenderness.   Musculoskeletal:      Right lower leg: No edema.      Left lower leg: No edema.   Neurological:      Mental Status: She is alert. Mental status is at baseline.   Psychiatric:         Mood and Affect: Mood normal.         Behavior: Behavior normal.           Additional Data:     Labs:  Results from last 7 days   Lab Units 08/16/24  0536   WBC Thousand/uL 4.76   HEMOGLOBIN g/dL 13.7   HEMATOCRIT % 38.8   PLATELETS Thousands/uL 193   SEGS PCT % 50   LYMPHO PCT % 39   MONO PCT % 9   EOS PCT % 2     Results from last 7 days   Lab Units 08/16/24  0536 08/14/24  0424 08/13/24  2101   SODIUM mmol/L 137   < > 135   POTASSIUM mmol/L 3.5   < > 4.0   CHLORIDE mmol/L 103   < > 94*   CO2 mmol/L 23   < > 28   BUN mg/dL 9   < > 16   CREATININE mg/dL 0.56*   < > 0.71   ANION GAP mmol/L 11   < > 13   CALCIUM mg/dL 7.6*   < > 9.8   ALBUMIN g/dL  --   --  5.2*   TOTAL BILIRUBIN mg/dL  --   --  0.53   ALK PHOS U/L  --   --  59   ALT U/L  --   --  13   AST U/L  --   --  17   GLUCOSE RANDOM mg/dL 94   < > 191*    < > = values in this interval not displayed.         Results from last 7 days   Lab Units 08/16/24  0536 08/16/24  0012 08/15/24  1808 08/15/24  1153 08/15/24  0529 08/15/24  0013 08/14/24  1822 08/14/24  1147 08/14/24  0745   POC GLUCOSE mg/dl 102 91 209* 109 95 88 110 93 161*     Results from last 7 days   Lab Units 08/15/24  0511   HEMOGLOBIN A1C % 7.3*           Lines/Drains:  Invasive Devices       Peripheral Intravenous Line  Duration             Peripheral IV 08/13/24 Right Antecubital 2 days    Peripheral IV 08/15/24 Right;Ventral (anterior) Forearm <1 day                          Imaging: No pertinent imaging reviewed.    Recent Cultures (last 7 days):         Last 24 Hours Medication List:   Current Facility-Administered Medications   Medication Dose Route Frequency Provider Last Rate    Famotidine (PF)  20 mg Intravenous BID Iglesia Hernandez MD      heparin (porcine)  5,000 Units Subcutaneous Q8H [facility] Iglesia Hernandez MD      hydrALAZINE  5 mg Intravenous Q6H PRN Iglesia Hernandez MD      insulin lispro  1-6 Units Subcutaneous Q6H [facility] Angela Tamar, PA-C      metoprolol succinate  50 mg Oral Daily Angela Boyle PA-C      morphine injection  2 mg Intravenous Q4H  PRN Iglesia Hernandez MD      morphine injection  4 mg Intravenous Q4H PRN Iglesia Hernandez MD      ondansetron  4 mg Intravenous Q6H PRN Iglesia Hernandez MD      phenol  1 spray Mouth/Throat Q2H PRN ABIGAIL Chin      sodium chloride (PF)  3 mL Intravenous Q1H PRN Joaquin Godoy, DO          Today, Patient Was Seen By: Angela Boyle PA-C    **Please Note: This note may have been constructed using a voice recognition system.**

## 2024-08-16 NOTE — PLAN OF CARE
Problem: PAIN - ADULT  Goal: Verbalizes/displays adequate comfort level or baseline comfort level  Description: Interventions:  - Encourage patient to monitor pain and request assistance  - Assess pain using appropriate pain scale  - Administer analgesics based on type and severity of pain and evaluate response  - Implement non-pharmacological measures as appropriate and evaluate response  - Consider cultural and social influences on pain and pain management  - Notify physician/advanced practitioner if interventions unsuccessful or patient reports new pain  Outcome: Progressing     Problem: INFECTION - ADULT  Goal: Absence or prevention of progression during hospitalization  Description: INTERVENTIONS:  - Assess and monitor for signs and symptoms of infection  - Monitor lab/diagnostic results  - Monitor all insertion sites, i.e. indwelling lines, tubes, and drains  - Monitor endotracheal if appropriate and nasal secretions for changes in amount and color  - [place] appropriate cooling/warming therapies per order  - Administer medications as ordered  - Instruct and encourage patient and family to use good hand hygiene technique  - Identify and instruct in appropriate isolation precautions for identified infection/condition  Outcome: Progressing     Problem: SAFETY ADULT  Goal: Patient will remain free of falls  Description: INTERVENTIONS:  - Educate patient/family on patient safety including physical limitations  - Instruct patient to call for assistance with activity   - Consult OT/PT to assist with strengthening/mobility   - Keep Call bell within reach  - Keep bed low and locked with side rails adjusted as appropriate  - Keep care items and personal belongings within reach  - Initiate and maintain comfort rounds  - Make Fall Risk Sign visible to staff  - Offer Toileting every 2 Hours, in advance of need  - Initiate/Maintain bed/chair alarm  - Obtain necessary fall risk management equipment: nonskid socks  -  Apply yellow socks and bracelet for high fall risk patients  - Consider moving patient to room near nurses station  Outcome: Progressing  Goal: Maintain or return to baseline ADL function  Description: INTERVENTIONS:  -  Assess patient's ability to carry out ADLs; assess patient's baseline for ADL function and identify physical deficits which impact ability to perform ADLs (bathing, care of mouth/teeth, toileting, grooming, dressing, etc.)  - Assess/evaluate cause of self-care deficits   - Assess range of motion  - Assess patient's mobility; develop plan if impaired  - Assess patient's need for assistive devices and provide as appropriate  - Encourage maximum independence but intervene and supervise when necessary  - Involve family in performance of ADLs  - Assess for home care needs following discharge   - Consider OT consult to assist with ADL evaluation and planning for discharge  - Provide patient education as appropriate  Outcome: Progressing  Goal: Maintains/Returns to pre admission functional level  Description: INTERVENTIONS:  - Perform AM-PAC 6 Click Basic Mobility/ Daily Activity assessment daily.  - Set and communicate daily mobility goal to care team and patient/family/caregiver.   - Collaborate with rehabilitation services on mobility goals if consulted  - Perform Range of Motion 3 times a day.  - Reposition patient every 3 hours.  - Dangle patient 3 times a day  - Stand patient 3 times a day  - Ambulate patient 3 times a day  - Out of bed to chair 3 times a day   - Out of bed for meals 3 times a day  - Out of bed for toileting  - Record patient progress and toleration of activity level   Outcome: Progressing     Problem: DISCHARGE PLANNING  Goal: Discharge to home or other facility with appropriate resources  Description: INTERVENTIONS:  - Identify barriers to discharge w/patient and caregiver  - Arrange for needed discharge resources and transportation as appropriate  - Identify discharge learning  needs (meds, wound care, etc.)  - Arrange for interpretive services to assist at discharge as needed  - Refer to Case Management Department for coordinating discharge planning if the patient needs post-hospital services based on physician/advanced practitioner order or complex needs related to functional status, cognitive ability, or social support system  Outcome: Progressing     Problem: Knowledge Deficit  Goal: Patient/family/caregiver demonstrates understanding of disease process, treatment plan, medications, and discharge instructions  Description: Complete learning assessment and assess knowledge base.  Interventions:  - Provide teaching at level of understanding  - Provide teaching via preferred learning methods  Outcome: Progressing     Problem: GASTROINTESTINAL - ADULT  Goal: Minimal or absence of nausea and/or vomiting  Description: INTERVENTIONS:  - Administer IV fluids if ordered to ensure adequate hydration  - Maintain NPO status until nausea and vomiting are resolved  - Nasogastric tube if ordered  - Administer ordered antiemetic medications as needed  - Provide nonpharmacologic comfort measures as appropriate  - Advance diet as tolerated, if ordered  - Consider nutrition services referral to assist patient with adequate nutrition and appropriate food choices  Outcome: Progressing  Goal: Maintains or returns to baseline bowel function  Description: INTERVENTIONS:  - Assess bowel function  - Encourage oral fluids to ensure adequate hydration  - Administer IV fluids if ordered to ensure adequate hydration  - Administer ordered medications as needed  - Encourage mobilization and activity  - Consider nutritional services referral to assist patient with adequate nutrition and appropriate food choices  Outcome: Progressing  Goal: Maintains adequate nutritional intake  Description: INTERVENTIONS:  - Monitor percentage of each meal consumed  - Identify factors contributing to decreased intake, treat as  appropriate  - Assist with meals as needed  - Monitor I&O, weight, and lab values if indicated  - Obtain nutrition services referral as needed  Outcome: Progressing  Goal: Oral mucous membranes remain intact  Description: INTERVENTIONS  - Assess oral mucosa and hygiene practices  - Implement preventative oral hygiene regimen  - Implement oral medicated treatments as ordered  - Initiate Nutrition services referral as needed  Outcome: Progressing

## 2024-08-16 NOTE — PLAN OF CARE
Problem: PAIN - ADULT  Goal: Verbalizes/displays adequate comfort level or baseline comfort level  Description: Interventions:  - Encourage patient to monitor pain and request assistance  - Assess pain using appropriate pain scale  - Administer analgesics based on type and severity of pain and evaluate response  - Implement non-pharmacological measures as appropriate and evaluate response  - Consider cultural and social influences on pain and pain management  - Notify physician/advanced practitioner if interventions unsuccessful or patient reports new pain  Outcome: Progressing     Problem: INFECTION - ADULT  Goal: Absence or prevention of progression during hospitalization  Description: INTERVENTIONS:  - Assess and monitor for signs and symptoms of infection  - Monitor lab/diagnostic results  - Monitor all insertion sites, i.e. indwelling lines, tubes, and drains  - Monitor endotracheal if appropriate and nasal secretions for changes in amount and color  - [place] appropriate cooling/warming therapies per order  - Administer medications as ordered  - Instruct and encourage patient and family to use good hand hygiene technique  - Identify and instruct in appropriate isolation precautions for identified infection/condition  Outcome: Progressing     Problem: SAFETY ADULT  Goal: Patient will remain free of falls  Description: INTERVENTIONS:  - Educate patient/family on patient safety including physical limitations  - Instruct patient to call for assistance with activity   - Consult OT/PT to assist with strengthening/mobility   - Keep Call bell within reach  - Keep bed low and locked with side rails adjusted as appropriate  - Keep care items and personal belongings within reach  - Initiate and maintain comfort rounds  - Make Fall Risk Sign visible to staff  - Offer Toileting every 2 Hours, in advance of need  - Initiate/Maintain bed/chair alarm  - Obtain necessary fall risk management equipment: nonskid socks  -  Apply yellow socks and bracelet for high fall risk patients  - Consider moving patient to room near nurses station  Outcome: Progressing  Goal: Maintain or return to baseline ADL function  Description: INTERVENTIONS:  -  Assess patient's ability to carry out ADLs; assess patient's baseline for ADL function and identify physical deficits which impact ability to perform ADLs (bathing, care of mouth/teeth, toileting, grooming, dressing, etc.)  - Assess/evaluate cause of self-care deficits   - Assess range of motion  - Assess patient's mobility; develop plan if impaired  - Assess patient's need for assistive devices and provide as appropriate  - Encourage maximum independence but intervene and supervise when necessary  - Involve family in performance of ADLs  - Assess for home care needs following discharge   - Consider OT consult to assist with ADL evaluation and planning for discharge  - Provide patient education as appropriate  Outcome: Progressing  Goal: Maintains/Returns to pre admission functional level  Description: INTERVENTIONS:  - Perform AM-PAC 6 Click Basic Mobility/ Daily Activity assessment daily.  - Set and communicate daily mobility goal to care team and patient/family/caregiver.   - Collaborate with rehabilitation services on mobility goals if consulted  - Perform Range of Motion 3 times a day.  - Reposition patient every 3 hours.  - Dangle patient 3 times a day  - Stand patient 3 times a day  - Ambulate patient 3 times a day  - Out of bed to chair 3 times a day   - Out of bed for meals 3 times a day  - Out of bed for toileting  - Record patient progress and toleration of activity level   Outcome: Progressing     Problem: DISCHARGE PLANNING  Goal: Discharge to home or other facility with appropriate resources  Description: INTERVENTIONS:  - Identify barriers to discharge w/patient and caregiver  - Arrange for needed discharge resources and transportation as appropriate  - Identify discharge learning  needs (meds, wound care, etc.)  - Arrange for interpretive services to assist at discharge as needed  - Refer to Case Management Department for coordinating discharge planning if the patient needs post-hospital services based on physician/advanced practitioner order or complex needs related to functional status, cognitive ability, or social support system  Outcome: Progressing     Problem: Knowledge Deficit  Goal: Patient/family/caregiver demonstrates understanding of disease process, treatment plan, medications, and discharge instructions  Description: Complete learning assessment and assess knowledge base.  Interventions:  - Provide teaching at level of understanding  - Provide teaching via preferred learning methods  Outcome: Progressing     Problem: GASTROINTESTINAL - ADULT  Goal: Minimal or absence of nausea and/or vomiting  Description: INTERVENTIONS:  - Administer IV fluids if ordered to ensure adequate hydration  - Maintain NPO status until nausea and vomiting are resolved  - Nasogastric tube if ordered  - Administer ordered antiemetic medications as needed  - Provide nonpharmacologic comfort measures as appropriate  - Advance diet as tolerated, if ordered  - Consider nutrition services referral to assist patient with adequate nutrition and appropriate food choices  Outcome: Progressing  Goal: Maintains or returns to baseline bowel function  Description: INTERVENTIONS:  - Assess bowel function  - Encourage oral fluids to ensure adequate hydration  - Administer IV fluids if ordered to ensure adequate hydration  - Administer ordered medications as needed  - Encourage mobilization and activity  - Consider nutritional services referral to assist patient with adequate nutrition and appropriate food choices  Outcome: Progressing  Goal: Maintains adequate nutritional intake  Description: INTERVENTIONS:  - Monitor percentage of each meal consumed  - Identify factors contributing to decreased intake, treat as  appropriate  - Assist with meals as needed  - Monitor I&O, weight, and lab values if indicated  - Obtain nutrition services referral as needed  Outcome: Progressing  Goal: Oral mucous membranes remain intact  Description: INTERVENTIONS  - Assess oral mucosa and hygiene practices  - Implement preventative oral hygiene regimen  - Implement oral medicated treatments as ordered  - Initiate Nutrition services referral as needed  Outcome: Progressing

## 2024-08-19 NOTE — UTILIZATION REVIEW
NOTIFICATION OF ADMISSION DISCHARGE   This is a Notification of Discharge from [facility]. Please be advised that this patient has been discharge from our facility. Below you will find the admission and discharge date and time including the patient’s disposition.   UTILIZATION REVIEW CONTACT:  Tony Foster  Utilization   Network Utilization Review Department  Phone: [phone] x carefully listen to the prompts. All voicemails are confidential.  Email: [email or web address]     ADMISSION INFORMATION  PRESENTATION DATE: 8/13/2024  7:48 PM  OBERVATION ADMISSION DATE: N/A  INPATIENT ADMISSION DATE: 8/14/24  1:01 AM   DISCHARGE DATE: 8/16/2024  4:40 PM   DISPOSITION:Home/Self Care    Network Utilization Review Department  ATTENTION: Please call with any questions or concerns to [phone] and carefully listen to the prompts so that you are directed to the right person. All voicemails are confidential.   For Discharge needs, contact Care Management DC Support Team at [phone] opt. 2  Send all requests for admission clinical reviews, approved or denied determinations and any other requests to dedicated fax number below belonging to the campus where the patient is receiving treatment. List of dedicated fax numbers for the Facilities:  FACILITY NAME UR FAX NUMBER   ADMISSION DENIALS (Administrative/Medical Necessity) [phone]   DISCHARGE SUPPORT TEAM ([facility]) [phone]   PARENT CHILD HEALTH (Maternity/NICU/Pediatrics) [phone]   [facility] [phone]   [facility] [phone]   [facility] [phone]   [facility] [phone]   [facility] [phone]   [facility] [phone]   [facility] [phone]   [facility] [phone]   [facility]  [facility] [phone]   [facility] [phone]   [facility] [phone]   [facility] [phone]

## 2025-03-28 ENCOUNTER — OFFICE VISIT (OUTPATIENT)
Dept: URGENT CARE | Facility: CLINIC | Age: 75
End: 2025-03-28
Payer: COMMERCIAL

## 2025-03-28 VITALS
HEART RATE: 96 BPM | RESPIRATION RATE: 18 BRPM | DIASTOLIC BLOOD PRESSURE: 56 MMHG | OXYGEN SATURATION: 98 % | SYSTOLIC BLOOD PRESSURE: 116 MMHG | TEMPERATURE: 98.4 F
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
## 2025-03-28 DIAGNOSIS — S40.812A ABRASION OF MULTIPLE SITES OF LEFT UPPER ARM, INITIAL ENCOUNTER: ICD-10-CM

## 2025-03-28 DIAGNOSIS — J20.8 ACUTE BACTERIAL BRONCHITIS: Primary | ICD-10-CM

## 2025-03-28 DIAGNOSIS — S80.212A ABRASION OF LEFT KNEE, INITIAL ENCOUNTER: ICD-10-CM

## 2025-03-28 DIAGNOSIS — B96.89 ACUTE BACTERIAL BRONCHITIS: Primary | ICD-10-CM

## 2025-03-28 PROCEDURE — 99203 OFFICE O/P NEW LOW 30 MIN: CPT

## 2025-03-28 RX ORDER — BENZONATATE 200 MG/1
200 CAPSULE ORAL 3 TIMES DAILY PRN
Qty: 20 CAPSULE | Refills: 0 | Status: SHIPPED | OUTPATIENT
Start: 2025-03-28

## 2025-03-28 RX ORDER — DOXYCYCLINE HYCLATE 100 MG
100 TABLET ORAL 2 TIMES DAILY
Qty: 10 TABLET | Refills: 0 | Status: SHIPPED | OUTPATIENT
Start: 2025-03-28 | End: 2025-04-02

## 2025-03-28 NOTE — PROGRESS NOTES
[facility]        NAME: Xena Holly is a 75 y.o. female  : 1950    MRN: [id number]  DATE: 2025  TIME: 2:43 PM    Assessment and Plan   Acute bacterial bronchitis [J20.8, B96.89]  1. Acute bacterial bronchitis  doxycycline hyclate (VIBRA-TABS) 100 mg tablet    benzonatate (TESSALON) 200 MG capsule      2. Abrasion of multiple sites of left upper arm, initial encounter        3. Abrasion of left knee, initial encounter        4. Fall on same level from slipping, tripping or stumbling, initial encounter          Pt with complex medical history. Cough and congestion x1 week. No fever or chills but has not been measuring temperature. Daughter present and reports worsening symptoms in the past 24-48 hours. Increased cough and mucus production. Will place on abx and cough med. If worsening symptoms follow up with ER. If not improving in 3-5 days follow up with pcp.  Advised to keep the wounds clean and dry. Monitor for any increased redness, swelling, warmth, pain or discharge. If this occurs a recheck was advised.     Patient Instructions       Follow up with PCP in 3-5 days.  Proceed to  ER if symptoms worsen.    If tests are performed, our office will contact you with results only if changes need to made to the care plan discussed with you at the visit. [facility]    Chief Complaint     Chief Complaint   Patient presents with    Cough     Congestion onset 1 week episode of dizziness this morning which caused her to fall hitting left upper arm          History of Present Illness       75-year-old female with significant past medical history presents to this clinic with complaint of cough and congestion.  Symptom onset 1 week prior to arrival.  Patient had episode of dizziness this morning and reports falling and striking her right knee and left upper arm.    Cough        Review of Systems   Review of Systems   HENT:  Positive for congestion.     Respiratory:  Positive for cough.    Musculoskeletal:  Positive for arthralgias.   Neurological:  Positive for dizziness.         Current Medications       Current Outpatient Medications:     benzonatate (TESSALON) 200 MG capsule, Take 1 capsule (200 mg total) by mouth 3 (three) times a day as needed for cough, Disp: 20 capsule, Rfl: 0    doxycycline hyclate (VIBRA-TABS) 100 mg tablet, Take 1 tablet (100 mg total) by mouth 2 (two) times a day for 5 days, Disp: 10 tablet, Rfl: 0    alendronate (FOSAMAX) 70 mg tablet, Take 70 mg by mouth once a week, Disp: , Rfl:     balsalazide (COLAZAL) 750 mg capsule, Take 2,250 mg by mouth 3 (three) times a day, Disp: , Rfl:     Calcium Carbonate-Vitamin D 600-5 MG-MCG CAPS, Take 1 capsule by mouth 2 (two) times a day, Disp: , Rfl:     Diclofenac Sodium (VOLTAREN) 1 %, Apply 2 g topically daily, Disp: , Rfl:     glipiZIDE (GLUCOTROL XL) 10 mg 24 hr tablet, Take 10 mg by mouth daily, Disp: , Rfl:     losartan-hydrochlorothiazide (HYZAAR) 100-12.5 MG per tablet, Take 1 tablet by mouth daily, Disp: , Rfl:     metoprolol succinate (TOPROL-XL) 50 mg 24 hr tablet, Take 50 mg by mouth daily, Disp: , Rfl:     mometasone (NASONEX) 50 mcg/act nasal spray, USE 2 SPRAYS INTO INTO EACH NOSTRIL EVERY DAY 90, Disp: , Rfl:     omeprazole (PriLOSEC) 40 MG capsule, Take 40 mg by mouth daily, Disp: , Rfl:     Ozempic, 0.25 or 0.5 MG/DOSE, 2 MG/3ML injection pen, Inject 0.25 mg under the skin every 7 days Wednesday, Disp: , Rfl:     rosuvastatin (CRESTOR) 40 MG tablet, Take 40 mg by mouth daily, Disp: , Rfl:     Trijardy XR 10-5-1000 MG TB24, Take 1 tablet by mouth in the morning, Disp: , Rfl:     Current Allergies     Allergies as of 03/28/2025    (No Known Allergies)            The following portions of the patient's history were reviewed and updated as appropriate: allergies, current medications, past family history, past medical history, past social history, past surgical history and problem  list.     Past Medical History:   Diagnosis Date    Diabetes mellitus (HCC)     HLD (hyperlipidemia)     Hypertension        Past Surgical History:   Procedure Laterality Date    CHOLECYSTECTOMY         History reviewed. No pertinent family history.      Medications have been verified.        Objective   /56 (BP Location: Left arm, Patient Position: Sitting, Cuff Size: Standard)   Pulse 96   Temp 98.4 °F (36.9 °C)   Resp 18   SpO2 98%        Physical Exam     Physical Exam  Vitals and nursing note reviewed. Exam conducted with a chaperone present (daughter).   Constitutional:       General: She is not in acute distress.     Appearance: Normal appearance. She is obese. She is not ill-appearing.   HENT:      Head: Normocephalic and atraumatic.      Right Ear: Tympanic membrane and external ear normal.      Left Ear: Tympanic membrane, ear canal and external ear normal.      Nose: Nose normal.      Mouth/Throat:      Mouth: Mucous membranes are moist.      Pharynx: Oropharynx is clear.   Eyes:      Extraocular Movements: Extraocular movements intact.      Conjunctiva/sclera: Conjunctivae normal.      Pupils: Pupils are equal, round, and reactive to light.   Cardiovascular:      Rate and Rhythm: Normal rate and regular rhythm.      Pulses: Normal pulses.      Heart sounds: Normal heart sounds.   Pulmonary:      Effort: Pulmonary effort is normal.      Breath sounds: Examination of the right-lower field reveals decreased breath sounds. Examination of the left-lower field reveals decreased breath sounds. Decreased breath sounds (right greater than left) present.      Comments: Frequent loose cough  Abdominal:      General: Bowel sounds are normal.      Palpations: Abdomen is soft.   Musculoskeletal:         General: Normal range of motion.        Arms:       Cervical back: Normal range of motion and neck supple.        Legs:    Skin:     General: Skin is warm and dry.      Capillary Refill: Capillary refill  takes less than 2 seconds.   Neurological:      General: No focal deficit present.      Mental Status: She is alert.

## 2025-03-28 NOTE — PATIENT INSTRUCTIONS
Take the abx each day and take until completion    Increase your fluid intake of liquids to stay hydrated     Coricidin HBP for cough and congestion

## 2025-04-02 ENCOUNTER — APPOINTMENT (EMERGENCY)
Dept: RADIOLOGY | Facility: HOSPITAL | Age: 75
End: 2025-04-02
Payer: COMMERCIAL

## 2025-04-02 ENCOUNTER — HOSPITAL ENCOUNTER (EMERGENCY)
Facility: HOSPITAL | Age: 75
Discharge: HOME | End: 2025-04-02
Attending: EMERGENCY MEDICINE
Payer: COMMERCIAL

## 2025-04-02 ENCOUNTER — APPOINTMENT (EMERGENCY)
Dept: CT IMAGING | Facility: HOSPITAL | Age: 75
End: 2025-04-02
Payer: COMMERCIAL

## 2025-04-02 VITALS
OXYGEN SATURATION: 97 % | RESPIRATION RATE: 18 BRPM | SYSTOLIC BLOOD PRESSURE: 148 MMHG | DIASTOLIC BLOOD PRESSURE: 73 MMHG | WEIGHT: 151.01 LBS | BODY MASS INDEX: 25.13 KG/M2 | HEART RATE: 84 BPM | TEMPERATURE: 97.8 F

## 2025-04-02 DIAGNOSIS — J06.9 URI (UPPER RESPIRATORY INFECTION): Primary | ICD-10-CM

## 2025-04-02 DIAGNOSIS — R53.83 FATIGUE: ICD-10-CM

## 2025-04-02 DIAGNOSIS — U07.1 COVID-19: ICD-10-CM

## 2025-04-02 LAB
ALBUMIN SERPL BCG-MCNC: 4.6 G/DL (ref 3.5–5)
ALP SERPL-CCNC: 66 U/L (ref 34–104)
ALT SERPL W P-5'-P-CCNC: 11 U/L (ref 7–52)
ANION GAP SERPL CALCULATED.3IONS-SCNC: 12 MMOL/L (ref 4–13)
AST SERPL W P-5'-P-CCNC: 19 U/L (ref 13–39)
BASOPHILS # BLD AUTO: 0.01 THOUSANDS/ÂΜL (ref 0–0.1)
BASOPHILS NFR BLD AUTO: 0 % (ref 0–1)
BILIRUB SERPL-MCNC: 0.69 MG/DL (ref 0.2–1)
BILIRUB UR QL STRIP: NEGATIVE
BUN SERPL-MCNC: 19 MG/DL (ref 5–25)
CALCIUM SERPL-MCNC: 10 MG/DL (ref 8.4–10.2)
CHLORIDE SERPL-SCNC: 95 MMOL/L (ref 96–108)
CLARITY UR: ABNORMAL
CO2 SERPL-SCNC: 26 MMOL/L (ref 21–32)
COLOR UR: YELLOW
CREAT SERPL-MCNC: 0.76 MG/DL (ref 0.6–1.3)
EOSINOPHIL # BLD AUTO: 0.05 THOUSAND/ÂΜL (ref 0–0.61)
EOSINOPHIL NFR BLD AUTO: 1 % (ref 0–6)
ERYTHROCYTE [DISTWIDTH] IN BLOOD BY AUTOMATED COUNT: 12.4 % (ref 11.6–15.1)
FLUAV AG UPPER RESP QL IA.RAPID: NEGATIVE
FLUBV AG UPPER RESP QL IA.RAPID: NEGATIVE
GFR SERPL CREATININE-BSD FRML MDRD: 76 ML/MIN/1.73SQ M
GLUCOSE SERPL-MCNC: 172 MG/DL (ref 65–140)
GLUCOSE UR STRIP-MCNC: ABNORMAL MG/DL
HCT VFR BLD AUTO: 44.8 % (ref 34.8–46.1)
HGB BLD-MCNC: 15.5 G/DL (ref 11.5–15.4)
HGB UR QL STRIP.AUTO: NEGATIVE
IMM GRANULOCYTES # BLD AUTO: 0.04 THOUSAND/UL (ref 0–0.2)
IMM GRANULOCYTES NFR BLD AUTO: 1 % (ref 0–2)
KETONES UR STRIP-MCNC: NEGATIVE MG/DL
LEUKOCYTE ESTERASE UR QL STRIP: NEGATIVE
LIPASE SERPL-CCNC: 92 U/L (ref 11–82)
LYMPHOCYTES # BLD AUTO: 2.13 THOUSANDS/ÂΜL (ref 0.6–4.47)
LYMPHOCYTES NFR BLD AUTO: 47 % (ref 14–44)
MCH RBC QN AUTO: 29.6 PG (ref 26.8–34.3)
MCHC RBC AUTO-ENTMCNC: 34.6 G/DL (ref 31.4–37.4)
MCV RBC AUTO: 86 FL (ref 82–98)
MONOCYTES # BLD AUTO: 0.41 THOUSAND/ÂΜL (ref 0.17–1.22)
MONOCYTES NFR BLD AUTO: 9 % (ref 4–12)
NEUTROPHILS # BLD AUTO: 1.94 THOUSANDS/ÂΜL (ref 1.85–7.62)
NEUTS SEG NFR BLD AUTO: 42 % (ref 43–75)
NITRITE UR QL STRIP: NEGATIVE
NRBC BLD AUTO-RTO: 0 /100 WBCS
PH UR STRIP.AUTO: 7 [PH]
PLATELET # BLD AUTO: 204 THOUSANDS/UL (ref 149–390)
PMV BLD AUTO: 9.4 FL (ref 8.9–12.7)
POTASSIUM SERPL-SCNC: 3.7 MMOL/L (ref 3.5–5.3)
PROT SERPL-MCNC: 7.5 G/DL (ref 6.4–8.4)
PROT UR STRIP-MCNC: NEGATIVE MG/DL
RBC # BLD AUTO: 5.23 MILLION/UL (ref 3.81–5.12)
SARS-COV+SARS-COV-2 AG RESP QL IA.RAPID: POSITIVE
SODIUM SERPL-SCNC: 133 MMOL/L (ref 135–147)
SP GR UR STRIP.AUTO: 1.01 (ref 1–1.03)
UROBILINOGEN UR STRIP-ACNC: <2 MG/DL
WBC # BLD AUTO: 4.58 THOUSAND/UL (ref 4.31–10.16)

## 2025-04-02 PROCEDURE — 36415 COLL VENOUS BLD VENIPUNCTURE: CPT | Performed by: STUDENT IN AN ORGANIZED HEALTH CARE EDUCATION/TRAINING PROGRAM

## 2025-04-02 PROCEDURE — 99284 EMERGENCY DEPT VISIT MOD MDM: CPT

## 2025-04-02 PROCEDURE — 74176 CT ABD & PELVIS W/O CONTRAST: CPT

## 2025-04-02 PROCEDURE — 99284 EMERGENCY DEPT VISIT MOD MDM: CPT | Performed by: EMERGENCY MEDICINE

## 2025-04-02 PROCEDURE — 71046 X-RAY EXAM CHEST 2 VIEWS: CPT

## 2025-04-02 PROCEDURE — 85025 COMPLETE CBC W/AUTO DIFF WBC: CPT | Performed by: STUDENT IN AN ORGANIZED HEALTH CARE EDUCATION/TRAINING PROGRAM

## 2025-04-02 PROCEDURE — 87804 INFLUENZA ASSAY W/OPTIC: CPT | Performed by: STUDENT IN AN ORGANIZED HEALTH CARE EDUCATION/TRAINING PROGRAM

## 2025-04-02 PROCEDURE — 83690 ASSAY OF LIPASE: CPT | Performed by: STUDENT IN AN ORGANIZED HEALTH CARE EDUCATION/TRAINING PROGRAM

## 2025-04-02 PROCEDURE — 80053 COMPREHEN METABOLIC PANEL: CPT | Performed by: STUDENT IN AN ORGANIZED HEALTH CARE EDUCATION/TRAINING PROGRAM

## 2025-04-02 PROCEDURE — 87811 SARS-COV-2 COVID19 W/OPTIC: CPT | Performed by: STUDENT IN AN ORGANIZED HEALTH CARE EDUCATION/TRAINING PROGRAM

## 2025-04-02 NOTE — ED ATTENDING ATTESTATION
4/2/2025  I, Charlotte rCews MD, saw and evaluated the patient. I have discussed the patient with the resident/non-physician practitioner and agree with the resident's/non-physician practitioner's findings, Plan of Care, and MDM as documented in the resident's/non-physician practitioner's note, except where noted. All available labs and Radiology studies were reviewed.  I was present for key portions of any procedure(s) performed by the resident/non-physician practitioner and I was immediately available to provide assistance.       At this point I agree with the current assessment done in the Emergency Department.  I have conducted an independent evaluation of this patient a history and physical is as follows: 10 days hx of congestion, cough with sputum. Recent visit to urgent care. Started on doxycycline for bacterial bronchitis. Finished antibiotics today. Comes in today as she has continued feelings of weakness. Decreased PO intake. Has had some abdominal pain as well. Heart RRR, lungs CTA, abdomen soft, some epigastric tenderness. No rebound or guarding.    ED Course  ED Course as of 04/02/25 1959 Wed Apr 02, 2025   1137 SARS COV Rapid Antigen(!): Positive         Critical Care Time  Procedures

## 2025-04-02 NOTE — ED PROVIDER NOTES
Time reflects when diagnosis was documented in both MDM as applicable and the Disposition within this note       Time User Action Codes Description Comment    4/2/2025  1:15 PM Charlotte Crews Add [J06.9] URI (upper respiratory infection)     4/2/2025  1:15 PM Charlotte Crews Add [U07.1] COVID-19     4/2/2025  1:16 PM Charlotte Crews Add [R53.83] Fatigue           ED Disposition       ED Disposition   Discharge    Condition   Stable    Date/Time   Wed Apr 2, 2025  1:15 PM    Comment   Xena Holly discharge to home/self care.                   Assessment & Plan       Medical Decision Making  This is a 75 y.o female with PMHx of diabetes, HTN, and HDL who presents to ED due to weakness. She reports that she arrived from [place] about a month ago and about 10 days ago she started feeling sick, she went to urgent care 5 days ago where she was diagnosed with bacterial bronchitis and was given doxycycline. This am she finished antibiotic treatment but continues to report fatigue, decreased appetite, and generalized weakness. She also reports weakness of the legs but is able to ambulate. She has been eating small amounts of food in the last 10 days but reports staying hydrated with water and fruits. She also reports epigastric abdominal pain which worsens with deep palpation, the abdominal pain is dull and 5/10. Lastly, she reports neck pain and neck tiredness.    Amount and/or Complexity of Data Reviewed  Labs: ordered. Decision-making details documented in ED Course.  Radiology: ordered. Decision-making details documented in ED Course.    Risk  Risk Details: Laboratory results are within the patient's baseline except for positive result for COVID and slight elevation of pancreatic enzymes. She continues to report fatigue but denies SOB at the moment. The patient can ambulate and is oriented x 3. She is not a candidate for oral antiviral medication as she has been having symptoms for 10 days now. CXR is  negative for pneumonia and abdominal CT unremarkable. The patient understand her diagnosis and agrees with physician recommendations. Will discharge patient home. Patient instructed to rest and to continue to hydrate and ingest foods as tolerated. Patient instructed to come back to the ED if she experienced SOB or if her symptoms worsen.        ED Course as of 04/02/25 1326   Wed Apr 02, 2025   1128 Laboratory results show decreased chloride and sodium with increased blood glucose and increased lipase     1129 GFR decreased from patient's baseline   1130 COVID positive   1204 U/A shows Glucosuria        Medications - No data to display    ED Risk Strat Scores                            SBIRT 22yo+      Flowsheet Row Most Recent Value   Initial Alcohol Screen: US AUDIT-C     1. How often do you have a drink containing alcohol? 0 Filed at: 04/02/2025 1003   2. How many drinks containing alcohol do you have on a typical day you are drinking?  0 Filed at: 04/02/2025 1003   3b. FEMALE Any Age, or MALE 65+: How often do you have 4 or more drinks on one occassion? 0 Filed at: 04/02/2025 1003   Audit-C Score 0 Filed at: 04/02/2025 1003   PAYAL: How many times in the past year have you...    Used an illegal drug or used a prescription medication for non-medical reasons? Never Filed at: 04/02/2025 1003                            History of Present Illness       Chief Complaint   Patient presents with    URI     States that she was seen at urgent care and diagnosed with bronchitis about a week ago, was placed on an antibiotic, but has been feeling worse. Complains of cough, and abdominal pain, denies any nausea, vomiting, or diarrhea        Past Medical History:   Diagnosis Date    Diabetes mellitus (HCC)     HLD (hyperlipidemia)     Hypertension       Past Surgical History:   Procedure Laterality Date    CHOLECYSTECTOMY        History reviewed. No pertinent family history.   Social History     Tobacco Use    Smoking status:  Former     Types: Cigarettes    Smokeless tobacco: Former   Vaping Use    Vaping status: Never Used   Substance Use Topics    Alcohol use: Not Currently    Drug use: Never      E-Cigarette/Vaping    E-Cigarette Use Never User       E-Cigarette/Vaping Substances      I have reviewed and agree with the history as documented.     This is a 75 y.o female with PMHx of diabetes, HTN, and HDL who presents to ED due to weakness. She reports that she arrived from [place] about a month ago and about 10 days ago she started feeling sick, she went to urgent care 5 days ago where she was diagnosed with bacterial bronchitis and was given doxycycline. This am she finished antibiotic treatment but continues to report fatigue, decreased appetite, and generalized weakness. She also reports weakness of the legs but is able to ambulate. She has been eating small amounts of food in the last 10 days but reports staying hydrated with water and fruits. She also reports epigastric abdominal pain which worsens with deep palpation, the abdominal pain is dull and 5/10. Lastly, she reports neck pain and neck tiredness.          History provided by:  Patient      Review of Systems   Constitutional:  Positive for appetite change and fatigue.   HENT:  Positive for rhinorrhea and sore throat.    Eyes: Negative.    Respiratory:  Positive for cough.    Cardiovascular: Negative.    Gastrointestinal:  Positive for abdominal pain.   Endocrine: Negative.    Genitourinary: Negative.    Musculoskeletal:  Positive for neck pain.   Skin: Negative.    Allergic/Immunologic: Negative.    Neurological:  Positive for light-headedness.   Hematological: Negative.    Psychiatric/Behavioral: Negative.             Objective       ED Triage Vitals [04/02/25 1001]   Temperature Pulse Blood Pressure Respirations SpO2 Patient Position - Orthostatic VS   (!) 97.2 °F (36.2 °C) 82 (!) 183/77 18 98 % Sitting      Temp src Heart Rate Source BP Location FiO2 (%) Pain  Score    -- Monitor Left arm -- --      Vitals      Date and Time Temp Pulse SpO2 Resp BP Pain Score FACES Pain Rating User   04/02/25 1312 -- -- -- -- 170/79 -- -- MM   04/02/25 1001 97.2 °F (36.2 °C) 82 98 % 18 183/77 -- -- [place]            Physical Exam  Constitutional:       Appearance: Normal appearance.   HENT:      Head: Normocephalic and atraumatic.      Nose: Nose normal.      Mouth/Throat:      Mouth: Mucous membranes are moist.   Eyes:      Extraocular Movements: Extraocular movements intact.      Conjunctiva/sclera: Conjunctivae normal.   Cardiovascular:      Pulses: Normal pulses.      Heart sounds: Normal heart sounds.   Pulmonary:      Effort: Pulmonary effort is normal.      Breath sounds: Normal breath sounds.   Abdominal:      Palpations: Abdomen is soft.      Tenderness: There is abdominal tenderness.   Musculoskeletal:         General: Normal range of motion.      Cervical back: Normal range of motion.   Skin:     General: Skin is warm.   Neurological:      General: No focal deficit present.      Mental Status: She is alert and oriented to person, place, and time.   Psychiatric:         Mood and Affect: Mood normal.         Results Reviewed       Procedure Component Value Units Date/Time    UA w Reflex to Microscopic w Reflex to Culture [[id number]]  (Abnormal) Collected: 04/02/25 1145    Lab Status: Final result Specimen: Urine, Clean Catch Updated: 04/02/25 1150     Color, UA Yellow     Clarity, UA Slightly Cloudy     Specific Gravity, UA 1.010     pH, UA 7.0     Leukocytes, UA Negative     Nitrite, UA Negative     Protein, UA Negative mg/dl      Glucose, UA 1000 (1%) mg/dl      Ketones, UA Negative mg/dl      Urobilinogen, UA <2.0 mg/dl      Bilirubin, UA Negative     Occult Blood, UA Negative    FLU/COVID Rapid Antigen (30 min. TAT) - Preferred screening test in ED [[id number]]  (Abnormal) Collected: 04/02/25 1103    Lab Status: Final result Specimen: Nares from Nose Updated: 04/02/25 1127      SARS COV Rapid Antigen Positive     Influenza A Rapid Antigen Negative     Influenza B Rapid Antigen Negative    Narrative:      This test has been performed using the [employer] Michelle 2 FLU+SARS Antigen test under the Emergency Use Authorization (EUA). This test has been validated by the  and verified by the performing laboratory. The Michelle uses lateral flow immunofluorescent sandwich assay to detect SARS-COV, Influenza A and Influenza B Antigen.     The [employer] Michelle 2 SARS Antigen test does not differentiate between SARS-CoV and SARS-CoV-2.     Negative results are presumptive and may be confirmed with a molecular assay, if necessary, for patient management. Negative results do not rule out SARS-CoV-2 or influenza infection and should not be used as the sole basis for treatment or patient management decisions. A negative test result may occur if the level of antigen in a sample is below the limit of detection of this test.     Positive results are indicative of the presence of viral antigens, but do not rule out bacterial infection or co-infection with other viruses.     All test results should be used as an adjunct to clinical observations and other information available to the provider.    FOR PEDIATRIC PATIENTS - copy/paste COVID Guidelines URL to browser: [email or web address]    Comprehensive metabolic panel [[id number]]  (Abnormal) Collected: 04/02/25 1103    Lab Status: Final result Specimen: Blood from Arm, Right Updated: 04/02/25 1125     Sodium 133 mmol/L      Potassium 3.7 mmol/L      Chloride 95 mmol/L      CO2 26 mmol/L      ANION GAP 12 mmol/L      BUN 19 mg/dL      Creatinine 0.76 mg/dL      Glucose 172 mg/dL      Calcium 10.0 mg/dL      AST 19 U/L      ALT 11 U/L      Alkaline Phosphatase 66 U/L      Total Protein 7.5 g/dL      Albumin 4.6 g/dL      Total Bilirubin 0.69 mg/dL      eGFR 76 ml/min/1.73sq m     Narrative:      National Kid[employer]  D[employer] guidelines for Chronic Kidney Disease (CKD):     Stage 1 with normal or high GFR (GFR > 90 mL/min/1.73 square meters)    Stage 2 Mild CKD (GFR = 60-89 mL/min/1.73 square meters)    Stage 3A Moderate CKD (GFR = 45-59 mL/min/1.73 square meters)    Stage 3B Moderate CKD (GFR = 30-44 mL/min/1.73 square meters)    Stage 4 Severe CKD (GFR = 15-29 mL/min/1.73 square meters)    Stage 5 End Stage CKD (GFR <15 mL/min/1.73 square meters)  Note: GFR calculation is accurate only with a steady state creatinine    Lipase [[id number]]  (Abnormal) Collected: 04/02/25 1103    Lab Status: Final result Specimen: Blood from Arm, Right Updated: 04/02/25 1125     Lipase 92 u/L     CBC and differential [[id number]]  (Abnormal) Collected: 04/02/25 1103    Lab Status: Final result Specimen: Blood from Arm, Right Updated: 04/02/25 1110     WBC 4.58 Thousand/uL      RBC 5.23 Million/uL      Hemoglobin 15.5 g/dL      Hematocrit 44.8 %      MCV 86 fL      MCH 29.6 pg      MCHC 34.6 g/dL      RDW 12.4 %      MPV 9.4 fL      Platelets 204 Thousands/uL      nRBC 0 /100 WBCs      Segmented % 42 %      Immature Grans % 1 %      Lymphocytes % 47 %      Monocytes % 9 %      Eosinophils Relative 1 %      Basophils Relative 0 %      Absolute Neutrophils 1.94 Thousands/µL      Absolute Immature Grans 0.04 Thousand/uL      Absolute Lymphocytes 2.13 Thousands/µL      Absolute Monocytes 0.41 Thousand/µL      Eosinophils Absolute 0.05 Thousand/µL      Basophils Absolute 0.01 Thousands/µL             XR chest 2 views   Final Interpretation by Alexander Sanches MD (04/02 1226)      No acute cardiopulmonary disease.      Postop findings in the right hemithorax as mentioned.      Workstation performed: [id number]         CT abdomen pelvis wo contrast   Final Interpretation by Zi Escobar MD (04/02 1255)      No acute findings in the abdomen or pelvis within the limits of unenhanced technique.      Workstation performed:  [id number]             Procedures    ED Medication and Procedure Management   Prior to Admission Medications   Prescriptions Last Dose Informant Patient Reported? Taking?   Calcium Carbonate-Vitamin D 600-5 MG-MCG CAPS   Yes No   Sig: Take 1 capsule by mouth 2 (two) times a day   Diclofenac Sodium (VOLTAREN) 1 %   Yes No   Sig: Apply 2 g topically daily   Ozempic, 0.25 or 0.5 MG/DOSE, 2 MG/3ML injection pen   Yes No   Sig: Inject 0.25 mg under the skin every 7 days Wednesday   Trijardy XR 10-5-1000 MG TB24   Yes No   Sig: Take 1 tablet by mouth in the morning   alendronate (FOSAMAX) 70 mg tablet   Yes No   Sig: Take 70 mg by mouth once a week   balsalazide (COLAZAL) 750 mg capsule   Yes No   Sig: Take 2,250 mg by mouth 3 (three) times a day   benzonatate (TESSALON) 200 MG capsule   No No   Sig: Take 1 capsule (200 mg total) by mouth 3 (three) times a day as needed for cough   doxycycline hyclate (VIBRA-TABS) 100 mg tablet   No No   Sig: Take 1 tablet (100 mg total) by mouth 2 (two) times a day for 5 days   glipiZIDE (GLUCOTROL XL) 10 mg 24 hr tablet   Yes No   Sig: Take 10 mg by mouth daily   losartan-hydrochlorothiazide (HYZAAR) 100-12.5 MG per tablet   Yes No   Sig: Take 1 tablet by mouth daily   metoprolol succinate (TOPROL-XL) 50 mg 24 hr tablet   Yes No   Sig: Take 50 mg by mouth daily   mometasone (NASONEX) 50 mcg/act nasal spray   Yes No   Sig: USE 2 SPRAYS INTO INTO EACH NOSTRIL EVERY DAY 90   omeprazole (PriLOSEC) 40 MG capsule   Yes No   Sig: Take 40 mg by mouth daily   rosuvastatin (CRESTOR) 40 MG tablet   Yes No   Sig: Take 40 mg by mouth daily      Facility-Administered Medications: None     Patient's Medications   Discharge Prescriptions    No medications on file     No discharge procedures on file.  ED SEPSIS DOCUMENTATION   Time reflects when diagnosis was documented in both MDM as applicable and the Disposition within this note       Time User Action Codes Description Comment    4/2/2025  1:15 PM  Charlotte Crews [J06.9] URI (upper respiratory infection)     4/2/2025  1:15 PM Charlotte Crews [U07.1] COVID-19     4/2/2025  1:16 PM Charlotte rCews [R53.83] Hari Nunez MD  04/02/25 1326

## 2025-04-02 NOTE — Clinical Note
accompanied Xena Holly to the emergency department on 4/2/2025.    Return date if applicable: 04/03/2025        If you have any questions or concerns, please don't hesitate to call.      Charlotte Crews MD